# Patient Record
Sex: FEMALE | Race: WHITE | NOT HISPANIC OR LATINO | Employment: FULL TIME | URBAN - METROPOLITAN AREA
[De-identification: names, ages, dates, MRNs, and addresses within clinical notes are randomized per-mention and may not be internally consistent; named-entity substitution may affect disease eponyms.]

---

## 2023-04-27 PROBLEM — H16.002 ULCER OF LEFT CORNEA: Status: ACTIVE | Noted: 2023-04-27

## 2023-04-27 PROBLEM — R79.89 LOW VITAMIN D LEVEL: Status: ACTIVE | Noted: 2023-04-27

## 2023-04-27 PROBLEM — M77.9 TENDONITIS: Status: ACTIVE | Noted: 2023-04-27

## 2023-04-27 PROBLEM — Z15.09 BRCA2 GENETIC CARRIER: Status: ACTIVE | Noted: 2023-04-27

## 2023-04-27 PROBLEM — G89.29 ABDOMINAL PAIN, CHRONIC, GENERALIZED: Status: ACTIVE | Noted: 2023-04-27

## 2023-04-27 PROBLEM — O35.EXX0: Status: ACTIVE | Noted: 2023-04-27

## 2023-04-27 PROBLEM — R53.83 FATIGUE: Status: ACTIVE | Noted: 2023-04-27

## 2023-04-27 PROBLEM — F34.1 PRIMARY DYSTHYMIA: Status: ACTIVE | Noted: 2023-04-27

## 2023-04-27 PROBLEM — M25.579 ANKLE PAIN: Status: ACTIVE | Noted: 2023-04-27

## 2023-04-27 PROBLEM — O99.891 MATERNAL RHEUMATOID ARTHRITIS COMPLICATING PREGNANCY (MULTI): Status: ACTIVE | Noted: 2023-04-27

## 2023-04-27 PROBLEM — R92.8 ABNORMAL FINDING ON BREAST IMAGING: Status: ACTIVE | Noted: 2023-04-27

## 2023-04-27 PROBLEM — N63.0 MASS OF BREAST: Status: ACTIVE | Noted: 2023-04-27

## 2023-04-27 PROBLEM — F41.9 ANXIETY: Status: ACTIVE | Noted: 2023-04-27

## 2023-04-27 PROBLEM — M06.9 RHEUMATOID ARTHRITIS (MULTI): Status: ACTIVE | Noted: 2023-04-27

## 2023-04-27 PROBLEM — Z98.890 S/P BREAST RECONSTRUCTION: Status: ACTIVE | Noted: 2023-04-27

## 2023-04-27 PROBLEM — M08.80 JUVENILE IDIOPATHIC ARTHRITIS (MULTI): Status: ACTIVE | Noted: 2023-04-27

## 2023-04-27 PROBLEM — M76.62 ACHILLES TENDINITIS OF LEFT LOWER EXTREMITY: Status: ACTIVE | Noted: 2023-04-27

## 2023-04-27 PROBLEM — Z84.81 FAMILY HISTORY OF BRCA2 GENE POSITIVE: Status: ACTIVE | Noted: 2023-04-27

## 2023-04-27 PROBLEM — M92.62 HAGLUND'S DEFORMITY, LEFT: Status: ACTIVE | Noted: 2023-04-27

## 2023-04-27 PROBLEM — K58.0 IRRITABLE BOWEL SYNDROME WITH DIARRHEA: Status: ACTIVE | Noted: 2023-04-27

## 2023-04-27 PROBLEM — M54.6 THORACIC BACK PAIN: Status: ACTIVE | Noted: 2023-04-27

## 2023-04-27 PROBLEM — K59.00 CONSTIPATION: Status: ACTIVE | Noted: 2023-04-27

## 2023-04-27 PROBLEM — M25.50 ARTHRALGIA: Status: ACTIVE | Noted: 2023-04-27

## 2023-04-27 PROBLEM — S29.012A STRAIN OF THORACIC SPINE: Status: ACTIVE | Noted: 2023-04-27

## 2023-04-27 PROBLEM — R10.84 ABDOMINAL PAIN, CHRONIC, GENERALIZED: Status: ACTIVE | Noted: 2023-04-27

## 2023-04-27 PROBLEM — O24.419: Status: ACTIVE | Noted: 2023-04-27

## 2023-04-27 PROBLEM — R10.9 ABDOMINAL CRAMPING: Status: ACTIVE | Noted: 2023-04-27

## 2023-04-27 PROBLEM — M06.9: Status: ACTIVE | Noted: 2023-04-27

## 2023-04-27 PROBLEM — R92.8 ABNORMAL MRI, BREAST: Status: ACTIVE | Noted: 2023-04-27

## 2023-04-27 PROBLEM — Z90.13 ACQUIRED ABSENCE OF BOTH BREASTS: Status: ACTIVE | Noted: 2023-04-27

## 2023-04-27 PROBLEM — M06.9 MATERNAL RHEUMATOID ARTHRITIS COMPLICATING PREGNANCY (MULTI): Status: ACTIVE | Noted: 2023-04-27

## 2023-04-27 PROBLEM — N93.9 VAGINAL BLEEDING, ABNORMAL: Status: ACTIVE | Noted: 2023-04-27

## 2023-04-27 PROBLEM — S90.32XA CONTUSION OF FOOT, LEFT: Status: ACTIVE | Noted: 2023-04-27

## 2023-04-27 PROBLEM — M25.552 HIP PAIN, ACUTE, LEFT: Status: ACTIVE | Noted: 2023-04-27

## 2023-04-27 PROBLEM — M54.9 BACK ACHE: Status: ACTIVE | Noted: 2023-04-27

## 2023-04-27 PROBLEM — M76.60 DISTAL ACHILLES TENDINITIS: Status: ACTIVE | Noted: 2023-04-27

## 2023-04-27 PROBLEM — Z15.01 BRCA2 GENETIC CARRIER: Status: ACTIVE | Noted: 2023-04-27

## 2023-04-27 PROBLEM — N94.6 DYSMENORRHEA: Status: ACTIVE | Noted: 2023-04-27

## 2023-04-27 PROBLEM — N64.4 BREAST PAIN, LEFT: Status: ACTIVE | Noted: 2023-04-27

## 2023-04-27 RX ORDER — CYCLOBENZAPRINE HCL 10 MG
1 TABLET ORAL 3 TIMES DAILY PRN
COMMUNITY
Start: 2022-10-04 | End: 2024-04-01 | Stop reason: ALTCHOICE

## 2023-04-27 RX ORDER — CITALOPRAM 10 MG/1
1 TABLET ORAL DAILY
COMMUNITY
End: 2023-12-26 | Stop reason: SDUPTHER

## 2023-04-27 RX ORDER — METHYLPREDNISOLONE 4 MG/1
TABLET ORAL
COMMUNITY
Start: 2022-10-04 | End: 2024-04-01 | Stop reason: ALTCHOICE

## 2023-04-27 RX ORDER — MELOXICAM 15 MG/1
1 TABLET ORAL DAILY
COMMUNITY
Start: 2022-10-04 | End: 2023-06-20 | Stop reason: SDUPTHER

## 2023-05-01 ENCOUNTER — APPOINTMENT (OUTPATIENT)
Dept: PRIMARY CARE | Facility: CLINIC | Age: 38
End: 2023-05-01
Payer: COMMERCIAL

## 2023-06-20 DIAGNOSIS — M06.9 RHEUMATOID ARTHRITIS, INVOLVING UNSPECIFIED SITE, UNSPECIFIED WHETHER RHEUMATOID FACTOR PRESENT (MULTI): ICD-10-CM

## 2023-06-21 RX ORDER — MELOXICAM 15 MG/1
15 TABLET ORAL DAILY PRN
Qty: 90 TABLET | Refills: 3 | Status: SHIPPED | OUTPATIENT
Start: 2023-06-21 | End: 2024-05-15 | Stop reason: SDUPTHER

## 2023-10-31 ENCOUNTER — APPOINTMENT (OUTPATIENT)
Dept: PRIMARY CARE | Facility: CLINIC | Age: 38
End: 2023-10-31
Payer: COMMERCIAL

## 2023-11-07 ENCOUNTER — OFFICE VISIT (OUTPATIENT)
Dept: PRIMARY CARE | Facility: CLINIC | Age: 38
End: 2023-11-07
Payer: COMMERCIAL

## 2023-11-07 VITALS
WEIGHT: 215 LBS | HEART RATE: 97 BPM | HEIGHT: 67 IN | DIASTOLIC BLOOD PRESSURE: 78 MMHG | TEMPERATURE: 97.2 F | RESPIRATION RATE: 14 BRPM | BODY MASS INDEX: 33.74 KG/M2 | OXYGEN SATURATION: 97 % | SYSTOLIC BLOOD PRESSURE: 120 MMHG

## 2023-11-07 DIAGNOSIS — M06.9: ICD-10-CM

## 2023-11-07 DIAGNOSIS — R07.89 ATYPICAL CHEST PAIN: Primary | ICD-10-CM

## 2023-11-07 DIAGNOSIS — F32.A ANXIETY AND DEPRESSION: ICD-10-CM

## 2023-11-07 DIAGNOSIS — F41.9 ANXIETY AND DEPRESSION: ICD-10-CM

## 2023-11-07 PROCEDURE — 3074F SYST BP LT 130 MM HG: CPT | Performed by: INTERNAL MEDICINE

## 2023-11-07 PROCEDURE — 3078F DIAST BP <80 MM HG: CPT | Performed by: INTERNAL MEDICINE

## 2023-11-07 PROCEDURE — 99213 OFFICE O/P EST LOW 20 MIN: CPT | Performed by: INTERNAL MEDICINE

## 2023-11-07 PROCEDURE — 1036F TOBACCO NON-USER: CPT | Performed by: INTERNAL MEDICINE

## 2023-11-07 RX ORDER — TIRZEPATIDE 2.5 MG/.5ML
INJECTION, SOLUTION SUBCUTANEOUS
COMMUNITY
Start: 2023-09-25

## 2023-11-07 NOTE — PROGRESS NOTES
"Subjective    Sharon Lino is a 38 y.o. female who presents for Anxiety.  HPI    Started Mounjaro in September. Was on it for 5 weeks. Around the same time started on EPIC at work.     [employer], [employer].     Started with anxiety, depression, crying. Restarted Celexa and is doing  well. She is not sure if the mounjaro is affecting her   Mood but she wants to restart it she lost 10 lbs while on it.    Wondering if its ok to restart Mounjaro.  She is doing great on it and she was worried it was causing her stress.   She has chest pain and she is not sure if she needs to be screening for lung cancer . Smoked 10 years            Review of Systems   All other systems reviewed and are negative.        Objective     /78 (BP Location: Left arm, Patient Position: Sitting, BP Cuff Size: Adult)   Pulse 97   Temp 36.2 °C (97.2 °F) (Skin)   Resp 14   Ht 1.702 m (5' 7\")   Wt 97.5 kg (215 lb)   SpO2 97%   BMI 33.67 kg/m²    Physical Exam  Vitals reviewed.   Constitutional:       General: She is not in acute distress.     Appearance: Normal appearance.   Cardiovascular:      Rate and Rhythm: Normal rate and regular rhythm.      Pulses: Normal pulses.      Heart sounds: Normal heart sounds.   Pulmonary:      Effort: Pulmonary effort is normal.      Breath sounds: Normal breath sounds.   Abdominal:      Tenderness: There is no abdominal tenderness.   Musculoskeletal:         General: No swelling.   Skin:     General: Skin is warm and dry.   Neurological:      Mental Status: She is alert.       Health Maintenance Due   Topic Date Due    Yearly Adult Physical  Never done    Hepatitis B Vaccines (1 of 3 - 3-dose series) Never done    HIV Screening  Never done    MMR Vaccines (1 of 1 - Standard series) Never done    Varicella Vaccines (1 of 2 - 2-dose childhood series) Never done    COVID-19 Vaccine (4 - Moderna series) 01/18/2022          Assessment/Plan   Problem List Items Addressed This Visit       Rheumatoid " arthritis involving left hip (CMS/HCC)     Other Visit Diagnoses       Atypical chest pain    -  Primary    Relevant Orders    XR chest 2 views    Anxiety and depression            Sample of mounjaro given  Doubt that has anything to do with her mood.   Likely the stress of work.  Call  with any problems or questions.   Follow up as needed  We discussed lung cancer screening recommendations. She is too young.  However she does have hx of  RA so will start with cxr.

## 2023-11-13 ENCOUNTER — HOSPITAL ENCOUNTER (OUTPATIENT)
Dept: RADIOLOGY | Facility: HOSPITAL | Age: 38
Discharge: HOME | End: 2023-11-13
Payer: COMMERCIAL

## 2023-11-13 DIAGNOSIS — R07.89 ATYPICAL CHEST PAIN: ICD-10-CM

## 2023-11-13 PROCEDURE — 71046 X-RAY EXAM CHEST 2 VIEWS: CPT

## 2023-11-13 PROCEDURE — 71046 X-RAY EXAM CHEST 2 VIEWS: CPT | Performed by: RADIOLOGY

## 2023-12-13 DIAGNOSIS — H92.09 EARACHE: Primary | ICD-10-CM

## 2023-12-13 RX ORDER — DOXYCYCLINE 100 MG/1
100 CAPSULE ORAL 2 TIMES DAILY
Qty: 20 CAPSULE | Refills: 0 | Status: SHIPPED | OUTPATIENT
Start: 2023-12-13 | End: 2023-12-23

## 2023-12-26 DIAGNOSIS — F32.A ANXIETY AND DEPRESSION: ICD-10-CM

## 2023-12-26 DIAGNOSIS — F41.9 ANXIETY AND DEPRESSION: ICD-10-CM

## 2023-12-26 NOTE — TELEPHONE ENCOUNTER
----- Message from Sharon Lino sent at 12/26/2023  2:55 PM EST -----  Regarding: Antibiotic   Contact: [phone]  Hi Dr. Escalante,     Can you please refill my Celexa 10mg please? [facility] [place] is my pharmacy. Thanks! Hope you had a good holiday!

## 2023-12-27 RX ORDER — CITALOPRAM 10 MG/1
10 TABLET ORAL DAILY
Qty: 90 TABLET | Refills: 3 | Status: SHIPPED | OUTPATIENT
Start: 2023-12-27 | End: 2024-12-26

## 2024-03-20 ENCOUNTER — APPOINTMENT (OUTPATIENT)
Dept: GASTROENTEROLOGY | Facility: HOSPITAL | Age: 39
End: 2024-03-20
Payer: COMMERCIAL

## 2024-03-21 ENCOUNTER — OFFICE VISIT (OUTPATIENT)
Dept: GASTROENTEROLOGY | Facility: CLINIC | Age: 39
End: 2024-03-21
Payer: COMMERCIAL

## 2024-03-21 VITALS
RESPIRATION RATE: 18 BRPM | OXYGEN SATURATION: 96 % | SYSTOLIC BLOOD PRESSURE: 114 MMHG | DIASTOLIC BLOOD PRESSURE: 76 MMHG | BODY MASS INDEX: 30.49 KG/M2 | WEIGHT: 194.3 LBS | HEART RATE: 78 BPM | HEIGHT: 67 IN

## 2024-03-21 DIAGNOSIS — R13.19 ESOPHAGEAL DYSPHAGIA: Primary | ICD-10-CM

## 2024-03-21 PROCEDURE — 1036F TOBACCO NON-USER: CPT | Performed by: INTERNAL MEDICINE

## 2024-03-21 PROCEDURE — 3078F DIAST BP <80 MM HG: CPT | Performed by: INTERNAL MEDICINE

## 2024-03-21 PROCEDURE — 99204 OFFICE O/P NEW MOD 45 MIN: CPT | Performed by: INTERNAL MEDICINE

## 2024-03-21 PROCEDURE — 3074F SYST BP LT 130 MM HG: CPT | Performed by: INTERNAL MEDICINE

## 2024-03-21 RX ORDER — OMEPRAZOLE 40 MG/1
40 CAPSULE, DELAYED RELEASE ORAL
Qty: 60 CAPSULE | Refills: 1 | Status: SHIPPED | OUTPATIENT
Start: 2024-03-21 | End: 2024-05-20

## 2024-03-21 NOTE — PROGRESS NOTES
Subjective   Patient ID: Sharon Lino is a 38 y.o. female who presents for GERD (Pt reports feeling like food is getting stuck when swallowing/regurgitating food and a burning sensation coming up her throat. Reports always having symptoms, however, this last week has been worse. No hx of EGD).  HPI  On  PPI for last year for heart burn    For last 1 weeks her symptoms are worse.     She has tried gaviscon which has helped . But it feels it is stuck.,     On Mounjaro.     Current Outpatient Medications on File Prior to Visit   Medication Sig Dispense Refill    citalopram (CeleXA) 10 mg tablet Take 1 tablet (10 mg) by mouth once daily. 90 tablet 3    meloxicam (Mobic) 15 mg tablet Take 1 tablet (15 mg) by mouth once daily as needed for moderate pain (4 - 6). 90 tablet 3    Mounjaro 2.5 mg/0.5 mL pen injector INJECT 2.5MG SUBCUTANEOUSLY ONCE EVERY WEEK      cyclobenzaprine (Flexeril) 10 mg tablet Take 1 tablet (10 mg) by mouth 3 times a day as needed.      methylPREDNISolone (Medrol Dospak) 4 mg tablets Take by mouth.       No current facility-administered medications on file prior to visit.        Review of Systems   Constitutional: Negative.  Negative for chills, fever and unexpected weight change.   HENT:  Negative for trouble swallowing.    Respiratory: Negative.  Negative for shortness of breath.    Cardiovascular: Negative.  Negative for chest pain.   Gastrointestinal:  Negative for abdominal distention, abdominal pain, anal bleeding, blood in stool, constipation, diarrhea, nausea, rectal pain and vomiting.   Endocrine: Negative.    Genitourinary: Negative.    Skin: Negative.  Negative for color change.   Neurological: Negative.        Objective   Physical Exam  Constitutional:       Appearance: Normal appearance.   HENT:      Head: Normocephalic and atraumatic.   Cardiovascular:      Rate and Rhythm: Normal rate and regular rhythm.      Pulses: Normal pulses.      Heart sounds: Normal heart sounds.  "  Pulmonary:      Effort: Pulmonary effort is normal.      Breath sounds: Normal breath sounds.   Abdominal:      General: Abdomen is flat. Bowel sounds are normal.      Palpations: Abdomen is soft.      Tenderness: There is no abdominal tenderness.   Musculoskeletal:         General: Normal range of motion.      Cervical back: Normal range of motion.   Skin:     General: Skin is warm.   Neurological:      Mental Status: She is alert.       /76 (BP Location: Right arm, Patient Position: Sitting, BP Cuff Size: Adult)   Pulse 78   Resp 18   Ht 1.702 m (5' 7\")   Wt 88.1 kg (194 lb 4.8 oz)   SpO2 96%   BMI 30.43 kg/m²      Lab Results   Component Value Date    WBC 5.8 12/08/2022    HGB 12.7 12/08/2022    HCT 39.9 12/08/2022    MCV 86 12/08/2022     12/08/2022           No lab exists for component: \"LABALBU\"    No results found for: \"AFP\"  Lab Results   Component Value Date    TSH 2.03 12/08/2022         CT ABDOMEN PELVIS W IV CONTRAST (Order [id number])     CT ABDOMEN PELVIS W IV CONTRAST  Order: [id number]  Impression    IMPRESSION:    There are 2 adjacent right adnexal cysts measuring 4.7 cm and 4.0 cm.  This can be further evaluated with pelvic ultrasound if clinically  indicated.    Small fat-containing umbilical hernia.  Mild associated fat stranding.              : AMIE    Transcribe Date/Time: Apr 26 2023  5:25P    Dictated by : CHELA AGUILERA MD    This examination was interpreted and the report reviewed and  electronically signed by:  CHELA AGUILERA MD on Apr 26 2023  5:39PM  EST  Narrative    * * *Final Report* * *    DATE OF EXAM: Apr 26 2023  5:08PM      [facility]   0530  -  CT ABD/PEL W IVCON  / ACCESSION #  [id number]    PROCEDURE REASON: Nausea/vomiting        * * * * Physician Interpretation * * * *     EXAMINATION:  CT ABDOMEN AND PELVIS WITH IV CONTRAST    CLINICAL HISTORY: Pain    TECHNIQUE: CT of the abdomen and pelvis was performed using " standard  technique, scanning from just above the dome of the diaphragm to the  symphysis pubis.  MQ:  CTAP_3    Contrast:  IV:  100 ml of Omnipaque 350  : ml of    CT Radiation dose: Integrated Dose-length product (DLP) for this visit =    768 mGy*cm.  CT Dose Reduction Employed: Automated exposure control (AEC)    COMPARISON: 03/26/2022      RESULT:    Liver: No mass.    Biliary: No bile duct dilation.  Gallbladder is collapsed.    Spleen: No mass. No splenomegaly.    Pancreas: No mass or duct dilation.    Adrenals: No mass.    Kidneys: No mass, calculus or hydronephrosis.    GI tract: No dilation or wall thickening. Prior appendectomy.    Lymph nodes: There are a few borderline enlarged presacral/perirectal  nodes, similar to prior.    Mesentery/Peritoneum: No ascites or mass.    Retroperitoneum: No mass.    Vasculature:   - Abdominal aorta and iliac arteries: No aneurysm.   - Celiac and SMA: Patent without stenosis.   - Portal venous system (SMV, splenic vein, portal vein and branches):  Patent.   - Hepatic veins: Patent.    Pelvis: There are 2 adjacent right adnexal cysts measuring 4.7 cm and 4.0  cm.    Bones/Soft Tissues: Breast implants.  Small fat-containing umbilical  hernia.  Mild associated fat stranding.    Lower thorax: Unremarkable.     (topogram) images: No additional findings.  Exam End: 04/26/23 17:08    Specimen Collected: 04/26/23 17:08 Last Resulted: 04/26/23 17:41   Received From: [facility]          Assessment/Plan   Diagnoses and all orders for this visit:  Esophageal dysphagia  -     EGD; Future  -     omeprazole (PriLOSEC) 40 mg DR capsule; Take 1 capsule (40 mg) by mouth 2 times a day before meals. Do not crush or chew.  GERD: Possibly worsening of symptoms due to Mounjaro.   Stop Mounjaro.

## 2024-03-21 NOTE — PATIENT INSTRUCTIONS
Esophageal dysphagia  -     EGD; Future  -     omeprazole (PriLOSEC) 40 mg DR capsule; Take 1 capsule (40 mg) by mouth 2 times a day before meals. Do not crush or chew.    Stop Mounjaro.

## 2024-03-28 ENCOUNTER — APPOINTMENT (OUTPATIENT)
Dept: GASTROENTEROLOGY | Facility: CLINIC | Age: 39
End: 2024-03-28
Payer: COMMERCIAL

## 2024-04-01 ENCOUNTER — HOSPITAL ENCOUNTER (OUTPATIENT)
Dept: GASTROENTEROLOGY | Facility: EXTERNAL LOCATION | Age: 39
Discharge: HOME | End: 2024-04-01
Payer: COMMERCIAL

## 2024-04-01 VITALS
HEART RATE: 79 BPM | HEIGHT: 67 IN | WEIGHT: 190 LBS | RESPIRATION RATE: 20 BRPM | TEMPERATURE: 98.1 F | SYSTOLIC BLOOD PRESSURE: 98 MMHG | BODY MASS INDEX: 29.82 KG/M2 | DIASTOLIC BLOOD PRESSURE: 68 MMHG | OXYGEN SATURATION: 99 %

## 2024-04-01 DIAGNOSIS — R13.19 ESOPHAGEAL DYSPHAGIA: Primary | ICD-10-CM

## 2024-04-01 PROCEDURE — 88342 IMHCHEM/IMCYTCHM 1ST ANTB: CPT

## 2024-04-01 PROCEDURE — 88305 TISSUE EXAM BY PATHOLOGIST: CPT

## 2024-04-01 PROCEDURE — 43239 EGD BIOPSY SINGLE/MULTIPLE: CPT | Performed by: INTERNAL MEDICINE

## 2024-04-01 PROCEDURE — 88305 TISSUE EXAM BY PATHOLOGIST: CPT | Performed by: STUDENT IN AN ORGANIZED HEALTH CARE EDUCATION/TRAINING PROGRAM

## 2024-04-01 PROCEDURE — 88342 IMHCHEM/IMCYTCHM 1ST ANTB: CPT | Performed by: STUDENT IN AN ORGANIZED HEALTH CARE EDUCATION/TRAINING PROGRAM

## 2024-04-01 RX ORDER — SODIUM CHLORIDE 9 MG/ML
20 INJECTION, SOLUTION INTRAVENOUS CONTINUOUS
Status: DISCONTINUED | OUTPATIENT
Start: 2024-04-01 | End: 2024-04-02 | Stop reason: HOSPADM

## 2024-04-01 ASSESSMENT — ENCOUNTER SYMPTOMS
FEVER: 0
ABDOMINAL PAIN: 0
NEUROLOGICAL NEGATIVE: 1
BLOOD IN STOOL: 0
SHORTNESS OF BREATH: 0
CONSTIPATION: 0
BLOOD IN STOOL: 0
CONSTITUTIONAL NEGATIVE: 1
COLOR CHANGE: 0
ABDOMINAL DISTENTION: 0
DIARRHEA: 0
TROUBLE SWALLOWING: 0
DIARRHEA: 0
CARDIOVASCULAR NEGATIVE: 1
RECTAL PAIN: 0
VOMITING: 0
CHILLS: 0
ENDOCRINE NEGATIVE: 1
UNEXPECTED WEIGHT CHANGE: 0
SHORTNESS OF BREATH: 0
CONSTIPATION: 0
ANAL BLEEDING: 0
NAUSEA: 0
TROUBLE SWALLOWING: 0
RECTAL PAIN: 0
ABDOMINAL DISTENTION: 0
COLOR CHANGE: 0
NAUSEA: 0
CHILLS: 0
VOMITING: 0
ABDOMINAL PAIN: 0
UNEXPECTED WEIGHT CHANGE: 0
RESPIRATORY NEGATIVE: 1
FEVER: 0
ANAL BLEEDING: 0

## 2024-04-01 ASSESSMENT — PAIN SCALES - GENERAL
PAINLEVEL_OUTOF10: 0 - NO PAIN

## 2024-04-01 ASSESSMENT — COLUMBIA-SUICIDE SEVERITY RATING SCALE - C-SSRS
6. HAVE YOU EVER DONE ANYTHING, STARTED TO DO ANYTHING, OR PREPARED TO DO ANYTHING TO END YOUR LIFE?: NO
2. HAVE YOU ACTUALLY HAD ANY THOUGHTS OF KILLING YOURSELF?: NO
1. IN THE PAST MONTH, HAVE YOU WISHED YOU WERE DEAD OR WISHED YOU COULD GO TO SLEEP AND NOT WAKE UP?: NO

## 2024-04-01 ASSESSMENT — PAIN - FUNCTIONAL ASSESSMENT
PAIN_FUNCTIONAL_ASSESSMENT: 0-10

## 2024-04-01 NOTE — H&P
History Of Present Illness  Sharon Lino is a 38 y.o. female presenting with dysphagia.  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    Gestational diabetes mellitus in pregnancy, unspecified control 05/17/2017    Gestational diabetes mellitus (GDM) affecting pregnancy, antepartum    Other acute postprocedural pain 08/04/2020    Post-op pain    Personal history of other diseases of the female genital tract 05/17/2017    History of endometriosis    Personal history of other diseases of the musculoskeletal system and connective tissue 05/17/2017    History of rheumatoid arthritis     Surgical History  Past Surgical History:   Procedure Laterality Date    DILATION AND CURETTAGE OF UTERUS  10/16/2018    Dilation And Curettage    LAPAROSCOPY DIAGNOSTIC / BIOPSY / ASPIRATION / LYSIS  05/17/2017    Laparoscopy (Diagnostic)    OTHER SURGICAL HISTORY  09/27/2021    Mastectomy    OTHER SURGICAL HISTORY  09/27/2021    Breast reconstruction    TONSILLECTOMY  05/17/2017    Tonsillectomy     Social History  She reports that she quit smoking about 9 years ago. Her smoking use included cigarettes. She has never used smokeless tobacco. She reports current alcohol use. She reports that she does not use drugs.    Family History  No family history on file.     Allergies  No Known Allergies  Review of Systems   Constitutional:  Negative for chills, fever and unexpected weight change.   HENT:  Negative for trouble swallowing.    Respiratory:  Negative for shortness of breath.    Cardiovascular:  Negative for chest pain.   Gastrointestinal:  Negative for abdominal distention, abdominal pain, anal bleeding, blood in stool, constipation, diarrhea, nausea, rectal pain and vomiting.   Skin:  Negative for color change.        Physical Exam  Vitals reviewed.   Constitutional:       General: She is awake.      Appearance: Normal  appearance.   HENT:      Head: Normocephalic and atraumatic.      Nose: Nose normal.      Mouth/Throat:      Mouth: Mucous membranes are moist.   Eyes:      Pupils: Pupils are equal, round, and reactive to light.   Cardiovascular:      Rate and Rhythm: Normal rate.   Pulmonary:      Effort: Pulmonary effort is normal.   Neurological:      Mental Status: She is alert and oriented to person, place, and time. Mental status is at baseline.   Psychiatric:         Attention and Perception: Attention and perception normal.         Mood and Affect: Mood normal.         Behavior: Behavior normal.          Last Recorded Vitals  There were no vitals taken for this visit.    Assessment/Plan   Dysphagia  GERD  EGD     Ashutosh Kimball MD

## 2024-04-03 ENCOUNTER — TELEPHONE (OUTPATIENT)
Dept: PRIMARY CARE | Facility: CLINIC | Age: 39
End: 2024-04-03
Payer: COMMERCIAL

## 2024-04-03 DIAGNOSIS — M25.50 ARTHRALGIA, UNSPECIFIED JOINT: Primary | ICD-10-CM

## 2024-04-03 RX ORDER — METHYLPREDNISOLONE 4 MG/1
TABLET ORAL
Qty: 21 TABLET | Refills: 0 | Status: SHIPPED | OUTPATIENT
Start: 2024-04-03 | End: 2024-04-10

## 2024-04-03 NOTE — TELEPHONE ENCOUNTER
Pt called asking for the following prescription due to back pain. I informed her that she might have to make an appointment to receive the prescription.    Rx Refill Request Telephone Encounter    Name:  Sharon Lino  :  [id number]  Medication Name:  methylPREDNISolone (Medrol Dospak) 4 mg tablets   Specific Pharmacy location:  [id number] - [address] [place]    Date of last appointment:  23  Date of next appointment:  n/a  Best number to reach patient:  [phone]

## 2024-04-03 NOTE — TELEPHONE ENCOUNTER
----- Message from Cindy Shrestha CMA sent at 4/3/2024 10:32 AM EDT -----  Regarding: FW: Medrol dose pack  Contact: [phone]    ----- Message -----  From: Sharon Lino  Sent: 4/3/2024   8:36 AM EDT  To: [facility] [place] [id number] Clinical Support Staff  Subject: Medrol dose pack                                 Hi Dr. Escalante,     I slept wrong and have neck and back pain. Can I please get a Medrol Dose pack please? I have been taking taking Mortin and Tylenol. Thank you.

## 2024-04-04 ENCOUNTER — APPOINTMENT (OUTPATIENT)
Dept: GASTROENTEROLOGY | Facility: CLINIC | Age: 39
End: 2024-04-04
Payer: COMMERCIAL

## 2024-04-10 LAB
LAB AP ASR DISCLAIMER: NORMAL
LABORATORY COMMENT REPORT: NORMAL
PATH REPORT.FINAL DX SPEC: NORMAL
PATH REPORT.GROSS SPEC: NORMAL
PATH REPORT.TOTAL CANCER: NORMAL

## 2024-04-29 ENCOUNTER — TELEPHONE (OUTPATIENT)
Dept: GYNECOLOGIC ONCOLOGY | Facility: HOSPITAL | Age: 39
End: 2024-04-29
Payer: COMMERCIAL

## 2024-04-29 NOTE — TELEPHONE ENCOUNTER
Patient called and stated that she is having lower abdominal/pelvic pain that will not subside with medication but is slightly eased with heat. She denies any constipation or diarrhea. She stated that the pain started on Friday after her menstrual cycle was complete.

## 2024-05-01 NOTE — RESULT ENCOUNTER NOTE
During recent upper endoscopy biopsies were taken from the stomach and the food pipe.  Pathology results showed these as completely normal.  Which means no infection or inflammation was identified.  If you continue to have issues with swallowing please make a follow-up appointment with me it can be a virtual visit.  Do not hesitate to contact me if you have any questions or concerns.  Sincerely,

## 2024-05-13 NOTE — PROGRESS NOTES
Patient ID: Sharon Lino is a 38 y.o. female.  Referring Physician: No referring provider defined for this encounter.  Primary Care Provider: Sandra Lind DO    Subjective    Interval History:  Notes LLQ pain persisting for 2 weeks simultaneously with her period she suspects its a cyst, Hx of cysts and similar pain with shorter duration, bowel movements, bladder and appetite are normal and she denies nausea.    She denies fever, chills, chest pain, SOB, nausea, vomiting, diarrhea, constipation, dysuria, or any other concerning signs of symptoms.          PMH:  Rheumatoid arthritis   Anxiety      Past Surgical History:  Laparoscopic appendectomy   Double mastectomy with reconstruction  D&C   Tonsillectomy     Family History:    - Father, no cancer diagnosis; BRCA2 mutation positive  - Sister, no cancer diagnosis; BRCA2 mutation positive  - PGM, breast cancer x2, pancreatic cancer, uterine cancer, bladder cancer; BRCA2 mutation positive;   - Paternal great-aunt, breast cancer age 50;   - Paternal great-great grandmother, gastric cancer,  at 53  - No Ashkenazi [employer]al ancestry     Social History: Denies current use of smoking, alcohol abuse or recreational drug use.  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     OBGYN History:  The patient is a . History of 2 SVDs. Menarche age 14. Completed childbearing. Heavy, painful periods every 2-3 weeks  using both pads and tampons changing them every 2-3 hours. Has tried LNG-IUD, OCPs.      Screening:  -Pap smear: 23 NILM/ HPV-  -Mammogram: undergoing high risk screening with breast surgery  -Colonoscopy: negative    Objective    BSA: 2.04 meters squared  /73 (BP Location: Left arm, Patient Position: Sitting, BP Cuff Size: Adult)   Pulse 72   Temp 36.3 °C (97.3 °F) (Temporal)   Resp 16   Wt 88.3 kg (194 lb 12.4 oz)   SpO2 97%   BMI 30.51 kg/m²      Physical Exam  Constitutional:       General: She  is not in acute distress.     Appearance: Normal appearance. She is not toxic-appearing.   HENT:      Head: Normocephalic.      Mouth/Throat:      Mouth: Mucous membranes are moist.      Pharynx: Oropharynx is clear.   Eyes:      Extraocular Movements: Extraocular movements intact.      Conjunctiva/sclera: Conjunctivae normal.      Pupils: Pupils are equal, round, and reactive to light.   Cardiovascular:      Rate and Rhythm: Normal rate and regular rhythm.      Heart sounds: Normal heart sounds. No murmur heard.     No friction rub. No gallop.   Pulmonary:      Effort: Pulmonary effort is normal.      Breath sounds: Normal breath sounds. No wheezing or rhonchi.   Abdominal:      General: Bowel sounds are normal. There is no distension.      Palpations: Abdomen is soft.      Tenderness: There is no abdominal tenderness.   Genitourinary:     Comments: Normal external genitalia no lesions or masses are appreciated  Bimanual examination: Smooth vagina no lesions or masses, tenderness to palpation particularly with the vaginal hand in the left pelvis, no discreet masses or lesions are noted, mild tenderness in the midline    Musculoskeletal:         General: Normal range of motion.      Cervical back: Normal range of motion.   Skin:     General: Skin is warm.   Neurological:      General: No focal deficit present.      Mental Status: She is alert and oriented to person, place, and time.   Psychiatric:         Mood and Affect: Mood normal.         Behavior: Behavior normal.         Performance Status:  Asymptomatic    Assessment/Plan     Oncology History    No history exists.   38 y.o.   here with BRCA2+ to establish with HMB  Comorbidities RA, anxiety   ECO     # BRCA2+  - We reviewed cancers associated with the BRCA mutation, and their relative risks. Specifically we discussed an approximately 15-30% lifetime risk of developing fallopian, ovarian or primary peritoneal  cancer.   - We discussed that there is no  effective screening for ovarian cancer  - We discussed preventative measures such as COCs  - We discussed the recommendation for rrBSO at the age 40-45 years  - We discussed that in about 12% of rrBSO surgeries an occult malignancy is discovered  - We discussed that unfortunately screening with  and pelvic US are not recommended but can be considered should symptoms change with time  - She would like to defer rrBSO until age 41 which is very reasonable     # HMB   - We discussed no association between BRCA2+ and uterine cancer   - S/p endometrial ablation  - She understands limitations with endometrial sampling after an ablation and should concern arise for malignancy would recommend hysterectomy   - Patient presenting with a 2 week period of LLQ pain, has had pain like this previously with cysts however it has never lasted this long.  - Plan for pelvic ultrasound with phone visit to follow, no discreete lesions or masses noted on examination today.      Scribe Attestation  By signing my name below, I, Kermit Interiano   attest that this documentation has been prepared under the direction and in the presence of Nicky Olvera MD.     Provider Attestation - Scribe documentation    All medical record entries made by the Scribe were at my direction and personally dictated by me. I have reviewed the chart and agree that the record accurately reflects my personal performance of the history, physical exam, discussion and plan.    Nicky Olvera MD

## 2024-05-14 ENCOUNTER — OFFICE VISIT (OUTPATIENT)
Dept: GYNECOLOGIC ONCOLOGY | Facility: CLINIC | Age: 39
End: 2024-05-14
Payer: COMMERCIAL

## 2024-05-14 VITALS
SYSTOLIC BLOOD PRESSURE: 108 MMHG | BODY MASS INDEX: 30.51 KG/M2 | OXYGEN SATURATION: 97 % | DIASTOLIC BLOOD PRESSURE: 73 MMHG | HEART RATE: 72 BPM | TEMPERATURE: 97.3 F | RESPIRATION RATE: 16 BRPM | WEIGHT: 194.78 LBS

## 2024-05-14 DIAGNOSIS — R10.2 PELVIC PAIN: ICD-10-CM

## 2024-05-14 DIAGNOSIS — Z15.09 BRCA2 GENETIC CARRIER: Primary | ICD-10-CM

## 2024-05-14 DIAGNOSIS — Z15.01 BRCA2 GENETIC CARRIER: Primary | ICD-10-CM

## 2024-05-14 PROCEDURE — 99214 OFFICE O/P EST MOD 30 MIN: CPT | Performed by: STUDENT IN AN ORGANIZED HEALTH CARE EDUCATION/TRAINING PROGRAM

## 2024-05-14 PROCEDURE — 3074F SYST BP LT 130 MM HG: CPT | Performed by: STUDENT IN AN ORGANIZED HEALTH CARE EDUCATION/TRAINING PROGRAM

## 2024-05-14 PROCEDURE — 3078F DIAST BP <80 MM HG: CPT | Performed by: STUDENT IN AN ORGANIZED HEALTH CARE EDUCATION/TRAINING PROGRAM

## 2024-05-14 PROCEDURE — 1036F TOBACCO NON-USER: CPT | Performed by: STUDENT IN AN ORGANIZED HEALTH CARE EDUCATION/TRAINING PROGRAM

## 2024-05-14 ASSESSMENT — PAIN SCALES - GENERAL: PAINLEVEL: 4

## 2024-05-15 ENCOUNTER — HOSPITAL ENCOUNTER (OUTPATIENT)
Dept: RADIOLOGY | Facility: HOSPITAL | Age: 39
Discharge: HOME | End: 2024-05-15
Payer: COMMERCIAL

## 2024-05-15 DIAGNOSIS — R10.2 PELVIC PAIN: ICD-10-CM

## 2024-05-15 DIAGNOSIS — Z15.01 BRCA2 GENETIC CARRIER: ICD-10-CM

## 2024-05-15 DIAGNOSIS — M06.9 RHEUMATOID ARTHRITIS, INVOLVING UNSPECIFIED SITE, UNSPECIFIED WHETHER RHEUMATOID FACTOR PRESENT (MULTI): ICD-10-CM

## 2024-05-15 DIAGNOSIS — Z15.09 BRCA2 GENETIC CARRIER: ICD-10-CM

## 2024-05-15 PROCEDURE — 76856 US EXAM PELVIC COMPLETE: CPT

## 2024-05-15 PROCEDURE — 76856 US EXAM PELVIC COMPLETE: CPT | Performed by: RADIOLOGY

## 2024-05-15 PROCEDURE — 76830 TRANSVAGINAL US NON-OB: CPT | Performed by: RADIOLOGY

## 2024-05-15 RX ORDER — MELOXICAM 15 MG/1
15 TABLET ORAL DAILY PRN
Qty: 90 TABLET | Refills: 3 | Status: SHIPPED | OUTPATIENT
Start: 2024-05-15 | End: 2025-05-15

## 2024-05-17 ENCOUNTER — TELEPHONE (OUTPATIENT)
Dept: GYNECOLOGIC ONCOLOGY | Facility: HOSPITAL | Age: 39
End: 2024-05-17
Payer: COMMERCIAL

## 2024-05-17 DIAGNOSIS — N83.209 CYST OF OVARY, UNSPECIFIED LATERALITY: Primary | ICD-10-CM

## 2024-05-17 NOTE — TELEPHONE ENCOUNTER
I called the patient with the results of her transvaginal/transabdominal ultrasound. I told her that she did have cysts that are not concerning per her physician. It was recommended that she have a repeat ultrasound in six weeks to ensure the cysts go away. The patient verbalized her understanding and has no further questions at this time.

## 2024-06-25 ENCOUNTER — HOSPITAL ENCOUNTER (OUTPATIENT)
Dept: RADIOLOGY | Facility: HOSPITAL | Age: 39
Discharge: HOME | End: 2024-06-25
Payer: COMMERCIAL

## 2024-06-25 DIAGNOSIS — N83.209 CYST OF OVARY, UNSPECIFIED LATERALITY: ICD-10-CM

## 2024-06-25 PROCEDURE — 76856 US EXAM PELVIC COMPLETE: CPT | Performed by: RADIOLOGY

## 2024-06-25 PROCEDURE — 76856 US EXAM PELVIC COMPLETE: CPT

## 2024-06-25 PROCEDURE — 76830 TRANSVAGINAL US NON-OB: CPT | Performed by: RADIOLOGY

## 2024-06-26 ENCOUNTER — TELEPHONE (OUTPATIENT)
Dept: GYNECOLOGIC ONCOLOGY | Facility: HOSPITAL | Age: 39
End: 2024-06-26
Payer: COMMERCIAL

## 2024-06-26 NOTE — TELEPHONE ENCOUNTER
Patient sent the following [employer] message:  Hi Dr. Olvera, I had my repeat ultrasound this morning. I am continuing to have lower abdominal pain intermittently. Do I need a follow up appointment or will someone call me with my results when they come in? Thank you.

## 2024-07-02 ENCOUNTER — TELEMEDICINE (OUTPATIENT)
Dept: GYNECOLOGIC ONCOLOGY | Facility: CLINIC | Age: 39
End: 2024-07-02
Payer: COMMERCIAL

## 2024-07-02 DIAGNOSIS — N80.9 ENDOMETRIOSIS DETERMINED BY LAPAROSCOPY: Primary | ICD-10-CM

## 2024-07-02 DIAGNOSIS — Z15.09 BRCA2 GENETIC CARRIER: ICD-10-CM

## 2024-07-02 DIAGNOSIS — Z15.01 BRCA2 GENETIC CARRIER: ICD-10-CM

## 2024-07-02 PROCEDURE — 99441 PR PHYS/QHP TELEPHONE EVALUATION 5-10 MIN: CPT | Performed by: STUDENT IN AN ORGANIZED HEALTH CARE EDUCATION/TRAINING PROGRAM

## 2024-07-02 NOTE — PROGRESS NOTES
Patient ID: Sharon Lino is a 38 y.o. female.  Referring Physician: No referring provider defined for this encounter.  Primary Care Provider: Sandra Lind DO    Virtual or Telephone Consent    A telephone visit (audio only) between the patient (at the originating site) and the provider (at the distant site) was utilized to provide this telehealth service.   Verbal consent was requested and obtained from Sharon Lino on this date, 24 for a telehealth visit.       Subjective    Interval History:  Overall doing well does not that she continues having intermittent abdominal pain that is worse around her periods, she notes pain with bowel movements,  pain with intercourse, heavier menstrual bleeding. She does note a Hx of laparoscopically proven endometriosis for which she had a diagnostic laparoscopy and removal of endometriosis. She notes that her Sx were significantly improved after this. She also notes that previously she had tried various forms of hormonal Rxs that did not work well for her.    She denies fever, chills, chest pain, SOB, nausea, vomiting, diarrhea, constipation, dysuria, or any other concerning signs of symptoms.      PMH:  Rheumatoid arthritis   Anxiety      Past Surgical History:  Laparoscopic appendectomy   Double mastectomy with reconstruction  D&C   Tonsillectomy     Family History:    - Father, no cancer diagnosis; BRCA2 mutation positive  - Sister, no cancer diagnosis; BRCA2 mutation positive  - PGM, breast cancer x2, pancreatic cancer, uterine cancer, bladder cancer; BRCA2 mutation positive;   - Paternal great-aunt, breast cancer age 50;   - Paternal great-great grandmother, gastric cancer,  at 53  - No Ashkenazi [employer] ancestry     Social History: Denies current use of smoking, alcohol abuse or recreational drug use.  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     OBGYN History:  The patient is a . History of 2 SVDs.  Menarche age 14. Completed childbearing. Heavy, painful periods every 2-3 weeks  using both pads and tampons changing them every 2-3 hours. Has tried LNG-IUD, OCPs.      Screening:  -Pap smear: 5/2/22 NILM/ HPV-  -Mammogram: undergoing high risk screening with breast surgery  -Colonoscopy: negative    Objective    BSA: There is no height or weight on file to calculate BSA.  There were no vitals taken for this visit.     Physical Exam  US PELVIS TRANSABDOMINAL WITH TRANSVAGINAL  Narrative: Interpreted By:  Lucía Dent,   STUDY:  US PELVIS TRANSABDOMINAL WITH TRANSVAGINAL;  6/25/2024 7:54 am      INDICATION:  Signs/Symptoms:abnormal ultrasound.  LMP 06/17/2024      COMPARISON:  05/15/2024      ACCESSION NUMBER(S):  [id number]      ORDERING CLINICIAN:  HAYDEN DAVIES      TECHNIQUE:  Multiple multiplanar static gray scale, color and spectral waveform  sonographic images of the pelvis were obtained. Transabdominal and  endovaginal ultrasound was performed.      FINDINGS:  UTERUS:  The uterus is  normal in size measuring 6.8 x 5.9 x 4.6 cm in  longitudinal, transverse and AP dimensions respectively. The uterus  is retroflexed. There is a 3.6 x 3.2 x 2.5 cm fundal fibroid      ENDOMETRIUM:  The endometrial canal is  normal in thickness measuring 0.7 cm.      RIGHT ADNEXA:  The right ovary is large in size measuring 7.7 x 5.0 x 4.0 cm.  There is a 4.3 x 4.1 x 3.6 cm cystic area with diffuse low-level  echoes. There are no septations. There is no solid component. This  could represent a hemorrhagic cyst or endometrioma. On 05/16/2024,  this measured 5.0 x 4.2 x 3.8 cm There is an adjacent abutting 2.6 x  2.4 cm cystic area with low-level echoes. There are no septations.  There is no solid component. This could represent a hemorrhagic cyst  or endometrioma. On 05/16/2024, this measured 2.5 x 2.5 x 2.4 cm  Color Doppler spectral wave imaging shows flow.      LEFT ADNEXA:  The left ovary measures 4.1 x 3.2 x 2.5  cm.  There is a 1.4 cm hypoechoic area in the left ovary which could  represent a follicle with low-level echoes and hemorrhage or  endometrioma. On 05/15/2024, this measured 2.1 x 2.0 x 1.8 cm  Color Doppler spectral wave imaging shows flow.      CUL DE SAC:  No gross free fluid is seen in the pelvic cul-de-sac.      Impression: 1. 3.6 cm fundal fibroid.  2. 2 cystic areas in the right ovary with diffuse low-level echoes,  likely hemorrhagic cysts or endometriomas. One of these is smaller in  one of these is unchanged.  3. 1.4 cm cystic area with low-level echoes in the left ovary, likely  a hemorrhagic follicle or small endometrioma. This is smaller.      MACRO:  None      Signed by: Lucía Dent 2024 9:47 AM  Dictation workstation:   [id number]        Performance Status:  Asymptomatic    Assessment/Plan     Oncology History    No history exists.   38 y.o.   here with BRCA2+ here for imaging review  Comorbidities RA, anxiety   ECO     # BRCA2+  - We reviewed cancers associated with the BRCA mutation, and their relative risks. Specifically we discussed an approximately 15-30% lifetime risk of developing fallopian, ovarian or primary peritoneal  cancer.   - We discussed that there is no effective screening for ovarian cancer  - We discussed preventative measures such as COCs  - We discussed the recommendation for rrBSO at the age 40-45 years  - We discussed that in about 12% of rrBSO surgeries an occult malignancy is discovered  - We discussed that unfortunately screening with  and pelvic US are not recommended but can be considered should symptoms change with time  - She would like to defer rrBSO until age 41 which is very reasonable  - We reviewed her ultrasound which shows stable to improved cystic masses concerning for endometriomas. Sx consistent with endometriosis patient does have a Hx of biopsy proven endometriosis.      # HMB   - We discussed no association between BRCA2+ and uterine  cancer   - S/p endometrial ablation  - She understands limitations with endometrial sampling after an ablation and should concern arise for malignancy would recommend hysterectomy   - Patient presenting with a 2 week period of LLQ pain, has had pain like this previously with cysts however it has never lasted this long.  - imaging reviewed which shows decreased in size of adnexal masses, suspect endometriosis    # Endometriosis   - We briefly reviewed options including combined oral contraceptives, she declines this currently as she has not previously tolerated hormonal Rxs well, we also discussed surgical intervention, and referral to [facility] providers for managment of her endometriosis.  - Plan for referral to [facility] for endometriosis management, will place referral    I spent 10 minutes virtually or in a telephone with this patient and/or family. More than 50% of the time was spent in counseling and/or coordination of care.      Scribe Attestation  By signing my name below, I, Kermit Interiano   attest that this documentation has been prepared under the direction and in the presence of Nicky Olvera MD.     Provider Attestation - Scribe documentation    All medical record entries made by the Scribe were at my direction and personally dictated by me. I have reviewed the chart and agree that the record accurately reflects my personal performance of the history, physical exam, discussion and plan.    Nicky Olvera MD

## 2024-08-06 NOTE — PROGRESS NOTES
Division of Minimally Invasive Gynecologic Surgery  [facility]    24 Gynecology Visit    CC:   Chief Complaint   Patient presents with    Consult     Consult for ovarian cysts  LMP: 24  PAP:22, WNL, HPV-NEG       Sharon Lino is a 38 y.o. referred to our practice by Dr. Olvera for BRCA 2 +and history of endometriosis    Patient was diagnosed with BRCA 2 mutation and presents for discussion of risk reducing surgery. She is s/p prophylactic mastectomy. She also has a h/o biopsy proven endometriosis.   S/p lpsc with Dr. Dodson in her 20s which helped her pain. She also reports a long h/o heavy periods and is s/p ablation in  for HMB.   Has significant pain worsened by her periods. She still has a periods once a month that is light, but is very painful. Patient is ready for surgery.    GI symptoms: pain with cycle  Urinary symptoms: none  Sexual activity: has pain with deep penetration. Worse closer to menses    Prior Therapies: Lng IUD, OCPs    Imaging:   --TVUS: The uterus is normal in size measuring 6.8 x 5.9 x 4.6 cm. There is a 3.6 x 3.2 x 2.5 cm fundal fibroid. The right ovary is large in size measuring 7.7 x 5.0 x 4.0 cm. There is a 4.3 x 4.1 x 3.6 cm cystic area with diffuse low-level  echoes. There are no septations. There is no solid component. This could represent a hemorrhagic cyst or endometrioma. On 2024, this measured 5.0 x 4.2 x 3.8 cm There is an adjacent abutting 2.6 x  2.4 cm cystic area with low-level echoes. There are no septations. There is no solid component. This could represent a hemorrhagic cyst or endometrioma. On 2024, this measured 2.5 x 2.5 x 2.4 cm  Color Doppler spectral wave imaging shows flow.    GYN Hx  Gynecologic history:  Contraception/menstrual regulation:  partner vasectomy  Desires Childbearing: denies  Last pap: NILM neg HPV   Grandmother with breast and pancreatic cancer. Dad + BRCA.     OBHx:  x  2  Pertinent PMH: BRCA 2, endometriosis  Pertient PSH: lpsc appendectomy. Lpsc EOE. bilateral mastectomy in 2020    PMHx, PSHx, SHx, Allergies, and Medications updated in Epic.  Past Medical History:   Diagnosis Date    Depression     Genetic susceptibility to malignant neoplasm of breast 05/17/2017    BRCA2 positive    GERD (gastroesophageal reflux disease)     Gestational diabetes mellitus in pregnancy, unspecified control ([facility]) 05/17/2017    Gestational diabetes mellitus (GDM) affecting pregnancy, antepartum    Other acute postprocedural pain 08/04/2020    Post-op pain    Personal history of other diseases of the female genital tract 05/17/2017    History of endometriosis    Personal history of other diseases of the musculoskeletal system and connective tissue 05/17/2017    History of rheumatoid arthritis     Past Surgical History:   Procedure Laterality Date    DILATION AND CURETTAGE OF UTERUS  10/16/2018    Dilation And Curettage    LAPAROSCOPY DIAGNOSTIC / BIOPSY / ASPIRATION / LYSIS  05/17/2017    Laparoscopy (Diagnostic)    OTHER SURGICAL HISTORY  09/27/2021    Mastectomy    OTHER SURGICAL HISTORY  09/27/2021    Breast reconstruction    TONSILLECTOMY  05/17/2017    Tonsillectomy     No Known Allergies    Current Outpatient Medications:     citalopram (CeleXA) 10 mg tablet, Take 1 tablet (10 mg) by mouth once daily., Disp: 90 tablet, Rfl: 3    meloxicam (Mobic) 15 mg tablet, Take 1 tablet (15 mg) by mouth once daily as needed for moderate pain (4 - 6)., Disp: 90 tablet, Rfl: 3    Mounjaro 2.5 mg/0.5 mL pen injector, INJECT 2.5MG SUBCUTANEOUSLY ONCE EVERY WEEK, Disp: , Rfl:     omeprazole (PriLOSEC) 40 mg DR capsule, Take 1 capsule (40 mg) by mouth 2 times a day before meals. Do not crush or chew., Disp: 60 capsule, Rfl: 1  Social History     Socioeconomic History    Marital status:      Spouse name: Not on file    Number of children: Not on file    Years of education: Not on file    Highest  "education level: Not on file   Occupational History    Not on file   Tobacco Use    Smoking status: Former     Current packs/day: 0.00     Types: Cigarettes     Quit date:      Years since quittin.6    Smokeless tobacco: Never   Vaping Use    Vaping status: Never Used   Substance and Sexual Activity    Alcohol use: Yes     Comment: rarely    Drug use: Never    Sexual activity: Not on file   Other Topics Concern    Not on file   Social History Narrative    Not on file     Social Determinants of Health     Financial Resource Strain: Not on file   Food Insecurity: Not on file   Transportation Needs: Not on file   Physical Activity: Not on file   Stress: Not on file   Social Connections: Not on file   Intimate Partner Violence: Not on file   Housing Stability: Not on file     No family history on file.  OB History    No obstetric history on file.            ROS: reviewed and negative    PE:/73   Pulse 79   Ht 1.727 m (5' 8\")   Wt 85.7 kg (189 lb)   BMI 28.74 kg/m²   Gen: NAD  Psych: AAOx3  Skin: no lesions  Pulm: nonlabored breathing, normal respiratory effort  Cards: normal peripheral perfusion  Lymph: no LAD in axilla or groin  GI: soft, non-tender, non-distended, no rebound/guarding, no masses  : deferred      A/P: Sharon Lino is a 38 y.o. with BRCA 2 mutation, h/o endometriosis, AUB and pelvic pain    BRCA2 mutation  - We reviewed cancers associated with the BRCA2 mutation, and their relative risks. Specifically we discussed an approximately  13 to 25% lifetime risk of developing fallopian, ovarian or primary peritoneal cancer.   - We discussed that there is no effective screening for ovarian cancer  - We discussed preventative measures such as COCs  - We discussed the recommendation for rrBSO at the age of 40-45 with BRCA 2 or once childbearing is complete  - We discussed that in about 12% of rrBSO surgeries an occult malignancy is discovered  - She is interested in rrBSO at this time.  " Discussed r/b of hysterectomy at the time of BSO. Discussed that hysterectomy would allow for estrogen only HRT which may decrease risk for breast cancer compared to E/P HRT, although data is not robust. Hysterectomy may help pain related to a uterine source, decrease interval to endometriosis recurrence and from pain related to prior ablation  - Discussed that HRT is generally considered safe in BRCA patient's without a known cancer diagnosis and the risk of HRT must be balanced with the risks of surgical menopause including early mortality, cardiovascular effects and osteoporosis. She is accepting of  estrogen only HRT.   - She desires rrBSO as well as hysterectomy for endometriosis    History of endometriosis  - We  discussed the multiple etiologies of chronic pelvic pain, including endometriosis, adenomyosis, GI etiologies, MSK etiologies, and centralization of pain.   - we also discussed pain may partially also be related to prior ablation which would require hysterectomy  -Regarding endometriosis, we discussed the natural history of the disease, superficial endometriosis, endometriomas, and deeply infiltrating disease. Extensively reviewed the medical and surgical treatment options available for endometriosis including NSAIDs, OCPs, progestin therapy (Mirena IUD, depo-provera, norethindrone), GnRH agonists, GnRH antagonists, aromatase inhibitors (i.e. Letrozole), Orilissa (Elagolix tablets), as well as the surgical options including excision of endometriosis alone, excision of endometriosis +/- Mirena IUD +/- appendectomy, and a hysterectomy with preservation of her ovaries. We reviewed at length the modern treatment of endometriosis, recommending preservation of ovaries if normal, despite the presence of extensive disease due to morbidity and mortality benefits.  - We discussed the natural history of endometriosis and the fact that hormonal suppression is thought to have a role in slowing disease progression  and managing symptoms of endometriosis, but cannot definitively reverse or eliminate endometriosis.   - She has tried multiple medical approaches and has not gotten adequate relief.   - I do think surgical excision is appropriate for this patient, and she desires to proceed with an aggressive surgical approach. We did however discuss that given the likelihood of pelvic floor dysfunction and the possibility of centralization of pain, she may not experience complete relief from surgery. She is aware she may require further treatment for these etiologies of pain after surgery.       She will be posted for robotic total laparoscopic hysterectomy, bilateral salpingoophorectomy, excision of endometriosis, cystoscopy and all indicated procedures    RTC postop    Dina Wong MD  Division of Minimally Invasive Gynecologic Surgery  [facility]

## 2024-08-08 ENCOUNTER — PATIENT MESSAGE (OUTPATIENT)
Dept: OBSTETRICS AND GYNECOLOGY | Facility: CLINIC | Age: 39
End: 2024-08-08

## 2024-08-08 ENCOUNTER — OFFICE VISIT (OUTPATIENT)
Dept: OBSTETRICS AND GYNECOLOGY | Facility: CLINIC | Age: 39
End: 2024-08-08
Payer: COMMERCIAL

## 2024-08-08 VITALS
HEART RATE: 79 BPM | HEIGHT: 68 IN | DIASTOLIC BLOOD PRESSURE: 73 MMHG | BODY MASS INDEX: 28.64 KG/M2 | WEIGHT: 189 LBS | SYSTOLIC BLOOD PRESSURE: 109 MMHG

## 2024-08-08 DIAGNOSIS — Z15.01 BRCA2 GENETIC CARRIER: ICD-10-CM

## 2024-08-08 DIAGNOSIS — Z15.09 BRCA2 GENETIC CARRIER: ICD-10-CM

## 2024-08-08 DIAGNOSIS — R10.2 PELVIC PAIN: ICD-10-CM

## 2024-08-08 DIAGNOSIS — N80.9 ENDOMETRIOSIS DETERMINED BY LAPAROSCOPY: Primary | ICD-10-CM

## 2024-08-08 PROCEDURE — 99215 OFFICE O/P EST HI 40 MIN: CPT | Performed by: STUDENT IN AN ORGANIZED HEALTH CARE EDUCATION/TRAINING PROGRAM

## 2024-08-08 RX ORDER — PHENAZOPYRIDINE HYDROCHLORIDE 200 MG/1
200 TABLET, FILM COATED ORAL ONCE
OUTPATIENT
Start: 2024-08-08 | End: 2024-08-08

## 2024-08-08 RX ORDER — GABAPENTIN 600 MG/1
600 TABLET ORAL ONCE
OUTPATIENT
Start: 2024-08-08 | End: 2024-08-08

## 2024-08-08 RX ORDER — CELECOXIB 400 MG/1
400 CAPSULE ORAL ONCE
OUTPATIENT
Start: 2024-08-08 | End: 2024-08-08

## 2024-08-08 RX ORDER — ACETAMINOPHEN 325 MG/1
975 TABLET ORAL ONCE
OUTPATIENT
Start: 2024-08-08 | End: 2024-08-08

## 2024-08-08 ASSESSMENT — PAIN SCALES - GENERAL: PAINLEVEL: 3

## 2024-08-09 NOTE — PATIENT COMMUNICATION
Returned call. Originally planned for BSO at time of surgery for BRCA. However patient called and is worried about menopause at her age and HRT. Would like to keep the left ovary if it looks normal to prevent the need for HRT and menopause. Patient is aware that she will need another surgery down the line to have the other ovary removed for risk reduction. However, feel it is very reasonable to keep the left ovary if it looks normal given that she is only 38 and recommendation is for removal at 40-45 to prevent need for HRT and menopausal side effects. Will still be having risk reduction with BS and RO.  Discussed we will plan to conserve the left ovary and otherwise proceed with surgery as planned. Patient is in agreement with plan of care.

## 2024-08-12 DIAGNOSIS — N80.9 ENDOMETRIOSIS DETERMINED BY LAPAROSCOPY: Primary | ICD-10-CM

## 2024-08-29 ENCOUNTER — CLINICAL SUPPORT (OUTPATIENT)
Dept: PREADMISSION TESTING | Facility: HOSPITAL | Age: 39
End: 2024-08-29
Payer: COMMERCIAL

## 2024-08-29 NOTE — CPM/PAT NURSE NOTE
CPM/PAT Nurse Note      Name: Shaorn Lino (Sharon Lino)  /Age: 1985/39 y.o.     Sharon Lino is scheduled for Robotic assisted total laparoscopic hysterectomy, bilateral salpingoophorectomy, exicsion of endometriosis, cystoscopy on 24    **Data Input  Past Medical History:   Diagnosis Date    Depression     Genetic susceptibility to malignant neoplasm of breast 2017    BRCA2 positive    GERD (gastroesophageal reflux disease)     Other acute postprocedural pain 2020    Post-op pain    Personal history of other diseases of the female genital tract 2017    History of endometriosis    Rheumatoid arthritis (Multi)        Past Surgical History:   Procedure Laterality Date    DILATION AND CURETTAGE OF UTERUS  10/16/2018    Dilation And Curettage    LAPAROSCOPY DIAGNOSTIC / BIOPSY / ASPIRATION / LYSIS  2017    Laparoscopy (Diagnostic)    MASTECTOMY      OTHER SURGICAL HISTORY  2021    Breast reconstruction    TONSILLECTOMY  2017    Tonsillectomy       Patient  has no history on file for sexual activity.    No family history on file.    No Known Allergies    Prior to Admission medications    Medication Sig Start Date End Date Taking? Authorizing Provider   citalopram (CeleXA) 10 mg tablet Take 1 tablet (10 mg) by mouth once daily. 23  Sandra Lind DO   meloxicam (Mobic) 15 mg tablet Take 1 tablet (15 mg) by mouth once daily as needed for moderate pain (4 - 6). 5/15/24 5/15/25  Sandra Lind DO   Mounjaro 2.5 mg/0.5 mL pen injector INJECT 2.5MG SUBCUTANEOUSLY ONCE EVERY WEEK 23   Historical Provider, MD   omeprazole (PriLOSEC) 40 mg DR capsule Take 1 capsule (40 mg) by mouth 2 times a day before meals. Do not crush or chew. 3/21/24 5/20/24  Ashutosh Kimball MD        PAT ROS     DASI Risk Score    No data to display       Caprini DVT Assessment    No data to display       Modified Frailty Index    No data to display        CHADS2 Stroke Risk  Current as of 7 hours ago        N/A 3 to 100%: High Risk   2 to < 3%: Medium Risk   0 to < 2%: Low Risk     Last Change: N/A          This score determines the patient's risk of having a stroke if the patient has atrial fibrillation.        This score is not applicable to this patient. Components are not calculated.          Revised Cardiac Risk Index    No data to display       Apfel Simplified Score    No data to display       Risk Analysis Index Results This Encounter    No data found in the last 1 encounters.     Providers:                                                                                                           PCP: Sandra Lind, 24  Hx of BRCA2+, post Double mastectomy with reconstruction     Gastroenterology: Ashutosh Bethhenryerasto, 25 presents for GERD (Pt reports feeling like food is getting stuck when swallowing/regurgitating food and a burning sensation coming up her throat.     Gynecologic: Dina Wong 25 Presents  for BRCA 2 +and history of endometriosis. s/p prophylactic mastectomy  And lpsc with Dr. Dodson in her 20s which helped her pain. Referred by Dr. Nicky Olvera.      --TESTING:      - EK24 at [facility], see media  NORMAL SINUS RHYTHM   NONSPECIFIC T WAVE ABNORMALITY   ABNORMAL ECG     - CT A/P: 23 at [facility]  IMPRESSION:   There are 2 adjacent right adnexal cysts measuring 4.7 cm and 4.0 cm.   This can be further evaluated with pelvic ultrasound if clinically   indicated.   Small fat-containing umbilical hernia.  Mild associated fat stranding.     - US Pelvis transvaginal: 24  IMPRESSION:  1. 3.6 cm fundal fibroid.  2. 2 cystic areas in the right ovary with diffuse low-level echoes,  likely hemorrhagic cysts or endometriomas. One of these is smaller in  one of these is unchanged.  3. 1.4 cm cystic area with low-level echoes in the left ovary, likely  a hemorrhagic follicle or small endometrioma. This is  smaller.        _______________________________________________________________  Medication instructions:   Instructed to hold Vitamins, Supplements and Ibuprofen 7 days prior to surgery       Vanna Samaniego LPN  Preadmission Testing

## 2024-09-05 ENCOUNTER — PRE-ADMISSION TESTING (OUTPATIENT)
Dept: PREADMISSION TESTING | Facility: HOSPITAL | Age: 39
End: 2024-09-05
Payer: COMMERCIAL

## 2024-09-05 VITALS
HEIGHT: 67 IN | SYSTOLIC BLOOD PRESSURE: 105 MMHG | OXYGEN SATURATION: 97 % | DIASTOLIC BLOOD PRESSURE: 71 MMHG | BODY MASS INDEX: 29.24 KG/M2 | HEART RATE: 95 BPM | WEIGHT: 186.29 LBS | TEMPERATURE: 97.3 F

## 2024-09-05 DIAGNOSIS — N80.9 ENDOMETRIOSIS DETERMINED BY LAPAROSCOPY: ICD-10-CM

## 2024-09-05 LAB
ABO GROUP (TYPE) IN BLOOD: NORMAL
ANTIBODY SCREEN: NORMAL
ERYTHROCYTE [DISTWIDTH] IN BLOOD BY AUTOMATED COUNT: 12.7 % (ref 11.5–14.5)
HCT VFR BLD AUTO: 43.2 % (ref 36–46)
HGB BLD-MCNC: 14.2 G/DL (ref 12–16)
MCH RBC QN AUTO: 28 PG (ref 26–34)
MCHC RBC AUTO-ENTMCNC: 32.9 G/DL (ref 32–36)
MCV RBC AUTO: 85 FL (ref 80–100)
NRBC BLD-RTO: 0 /100 WBCS (ref 0–0)
PLATELET # BLD AUTO: 379 X10*3/UL (ref 150–450)
RBC # BLD AUTO: 5.08 X10*6/UL (ref 4–5.2)
RH FACTOR (ANTIGEN D): NORMAL
WBC # BLD AUTO: 6.9 X10*3/UL (ref 4.4–11.3)

## 2024-09-05 PROCEDURE — 36415 COLL VENOUS BLD VENIPUNCTURE: CPT

## 2024-09-05 PROCEDURE — 86901 BLOOD TYPING SEROLOGIC RH(D): CPT

## 2024-09-05 PROCEDURE — 86900 BLOOD TYPING SEROLOGIC ABO: CPT

## 2024-09-05 PROCEDURE — 85027 COMPLETE CBC AUTOMATED: CPT

## 2024-09-05 PROCEDURE — 99204 OFFICE O/P NEW MOD 45 MIN: CPT | Performed by: NURSE PRACTITIONER

## 2024-09-05 RX ORDER — OMEPRAZOLE 20 MG/1
20 TABLET, DELAYED RELEASE ORAL
COMMUNITY

## 2024-09-05 ASSESSMENT — ENCOUNTER SYMPTOMS
ENDOCRINE NEGATIVE: 1
CARDIOVASCULAR NEGATIVE: 1
NEUROLOGICAL NEGATIVE: 1
CONSTITUTIONAL NEGATIVE: 1
NECK NEGATIVE: 1
EYES NEGATIVE: 1
RESPIRATORY NEGATIVE: 1
GASTROINTESTINAL NEGATIVE: 1
MUSCULOSKELETAL NEGATIVE: 1

## 2024-09-05 ASSESSMENT — DUKE ACTIVITY SCORE INDEX (DASI)
CAN YOU DO HEAVY WORK AROUND THE HOUSE LIKE SCRUBBING FLOORS OR LIFTING AND MOVING HEAVY FURNITURE: YES
CAN YOU DO MODERATE WORK AROUND THE HOUSE LIKE VACUUMING, SWEEPING FLOORS OR CARRYING GROCERIES: YES
CAN YOU RUN A SHORT DISTANCE: YES
CAN YOU WALK A BLOCK OR TWO ON LEVEL GROUND: YES
CAN YOU PARTICIPATE IN MODERATE RECREATIONAL ACTIVITIES LIKE GOLF, BOWLING, DANCING, DOUBLES TENNIS OR THROWING A BASEBALL OR FOOTBALL: YES
CAN YOU PARTICIPATE IN STRENOUS SPORTS LIKE SWIMMING, SINGLES TENNIS, FOOTBALL, BASKETBALL, OR SKIING: YES
CAN YOU WALK INDOORS, SUCH AS AROUND YOUR HOUSE: YES
CAN YOU DO LIGHT WORK AROUND THE HOUSE LIKE DUSTING OR WASHING DISHES: YES
CAN YOU CLIMB A FLIGHT OF STAIRS OR WALK UP A HILL: YES
CAN YOU TAKE CARE OF YOURSELF (EAT, DRESS, BATHE, OR USE TOILET): YES
CAN YOU DO YARD WORK LIKE RAKING LEAVES, WEEDING OR PUSHING A MOWER: YES

## 2024-09-05 ASSESSMENT — LIFESTYLE VARIABLES: SMOKING_STATUS: NONSMOKER

## 2024-09-05 NOTE — CPM/PAT H&P
CPM/PAT Evaluation       Name: Sharon Lino (Sharon Lino)  /Age: 1985/39 y.o.     Visit Type:   In-Person       Chief Complaint: risk reduction surgery, preop eval     HPI  The patient is a 39 year old female with history of BRCA 2 +, endometriosis and uterine fibroid and AUB. She presents today for perioperative evaluation in anticipation of Robotic assisted total laparoscopic hysterectomy, bilateral salpingoophorectomy, exicsion of endometriosis, cystoscopy and all indicated procedures on 24 with Dr. Wong.    Past Medical History:   Diagnosis Date    Depression     Dysfunctional uterine bleeding     Dysphagia     Esophageal dysphagia    Fibroid     Genetic susceptibility to malignant neoplasm of breast     BRCA2 positive, s/p b/l Mastectomy    GERD (gastroesophageal reflux disease)     HELLP syndrome (HHS-HCC)         History of blood transfusion      after giving birth    History of endometriosis     Other acute postprocedural pain 2020    Post-op pain    PONV (postoperative nausea and vomiting)     Rheumatoid arthritis (Multi)     Rheumatoid arthritis (Multi)        Past Surgical History:   Procedure Laterality Date    APPENDECTOMY      DILATION AND CURETTAGE OF UTERUS  10/16/2018    Dilation And Curettage    ESOPHAGOGASTRODUODENOSCOPY      LAPAROSCOPY DIAGNOSTIC / BIOPSY / ASPIRATION / LYSIS  2017    Laparoscopy (Diagnostic)    MASTECTOMY Bilateral     bilateral mastectomy in     OTHER SURGICAL HISTORY  2021    Breast reconstruction    OTHER SURGICAL HISTORY      Novasure ablation    TONSILLECTOMY  2017    Tonsillectomy       Patient  has no history on file for sexual activity.    Family History   Problem Relation Name Age of Onset    Hypertension Mother         No Known Allergies    Prior to Admission medications    Medication Sig Start Date End Date Taking? Authorizing Provider   citalopram (CeleXA) 10 mg tablet Take 1 tablet (10 mg) by mouth once  daily. 12/27/23 12/26/24  Sandra Lind DO   meloxicam (Mobic) 15 mg tablet Take 1 tablet (15 mg) by mouth once daily as needed for moderate pain (4 - 6). 5/15/24 5/15/25  Sandra Lind DO   Mounjaro 2.5 mg/0.5 mL pen injector INJECT 2.5MG SUBCUTANEOUSLY ONCE EVERY WEEK 9/25/23   Historical Provider, MD   omeprazole (PriLOSEC) 40 mg DR capsule Take 1 capsule (40 mg) by mouth 2 times a day before meals. Do not crush or chew. 3/21/24 5/20/24  Ashutosh Kimball MD        PAT ROS:   Constitutional:   neg    Neuro/Psych:   neg    Eyes:   neg    Ears:   neg    Nose:   neg    Mouth:   neg    Throat:   neg    Neck:   neg    Cardio:   neg    Respiratory:   neg    Endocrine:   neg    GI:   neg    :   neg    Musculoskeletal:   neg    Hematologic:   neg    Skin:  neg        Physical Exam  Vitals reviewed.   Constitutional:       Appearance: Normal appearance.   HENT:      Head: Normocephalic and atraumatic.      Nose: Nose normal.      Mouth/Throat:      Mouth: Mucous membranes are moist.      Pharynx: Oropharynx is clear.   Eyes:      Extraocular Movements: Extraocular movements intact.      Conjunctiva/sclera: Conjunctivae normal.      Pupils: Pupils are equal, round, and reactive to light.   Cardiovascular:      Rate and Rhythm: Normal rate and regular rhythm.      Pulses: Normal pulses.      Heart sounds: Normal heart sounds.   Pulmonary:      Effort: Pulmonary effort is normal.      Breath sounds: Normal breath sounds.   Musculoskeletal:         General: Normal range of motion.      Cervical back: Normal range of motion.   Skin:     General: Skin is warm and dry.   Neurological:      General: No focal deficit present.      Mental Status: She is alert and oriented to person, place, and time.   Psychiatric:         Mood and Affect: Mood normal.         Behavior: Behavior normal.          PAT AIRWAY:   Airway:     Mallampati::  II    TM distance::  >3 FB    Neck ROM::  Full  normal        Visit Vitals  BP  "105/71   Pulse 95   Temp 36.3 °C (97.3 °F) (Temporal)   Ht 1.702 m (5' 7\")   Wt 84.5 kg (186 lb 4.6 oz)   SpO2 97%   BMI 29.18 kg/m²   OB Status Ablation   Smoking Status Former   BSA 2 m²          DASI Risk Score    No data to display       Caprini DVT Assessment      Flowsheet Row Most Recent Value   DVT Score 7   Current Status Major surgery planned, lasting over 3 hours   History Prior major surgery   Age Less than 40 years   BMI 30 or less          Modified Frailty Index      Flowsheet Row Most Recent Value   Modified Frailty Index Calculator 0          CHADS2 Stroke Risk  Current as of 2 hours ago        N/A 3 to 100%: High Risk   2 to < 3%: Medium Risk   0 to < 2%: Low Risk     Last Change: N/A          This score determines the patient's risk of having a stroke if the patient has atrial fibrillation.        This score is not applicable to this patient. Components are not calculated.          Revised Cardiac Risk Index      Flowsheet Row Most Recent Value   Revised Cardiac Risk Calculator 0          Apfel Simplified Score      Flowsheet Row Most Recent Value   Apfel Simplified Score Calculator 3          Risk Analysis Index Results This Encounter    No data found in the last 1 encounters.       Stop Bang Score      Flowsheet Row Most Recent Value   Do you snore loudly? 0   Do you often feel tired or fatigued after your sleep? 0   Has anyone ever observed you stop breathing in your sleep? 0   Do you have or are you being treated for high blood pressure? 0   Recent BMI (Calculated) 28.7   Is BMI greater than 35 kg/m2? 0=No   Age older than 50 years old? 0=No   Is your neck circumference greater than 17 inches (Male) or 16 inches (Female)? 0   Gender - Male 0=No   STOP-BANG Total Score 0          Recent Results (from the past 504 hour(s))   Type And Screen    Collection Time: 09/05/24  9:51 AM   Result Value Ref Range    ABO TYPE AB     Rh TYPE NEG     ANTIBODY SCREEN NEG    CBC    Collection Time: 09/05/24  " 9:51 AM   Result Value Ref Range    WBC 6.9 4.4 - 11.3 x10*3/uL    nRBC 0.0 0.0 - 0.0 /100 WBCs    RBC 5.08 4.00 - 5.20 x10*6/uL    Hemoglobin 14.2 12.0 - 16.0 g/dL    Hematocrit 43.2 36.0 - 46.0 %    MCV 85 80 - 100 fL    MCH 28.0 26.0 - 34.0 pg    MCHC 32.9 32.0 - 36.0 g/dL    RDW 12.7 11.5 - 14.5 %    Platelets 379 150 - 450 x10*3/uL        Diagnostic Results          - CT A/P: [facility]/26/23 at [facility]  IMPRESSION:   There are 2 adjacent right adnexal cysts measuring 4.7 cm and 4.0 cm.   This can be further evaluated with pelvic ultrasound if clinically   indicated.   Small fat-containing umbilical hernia.  Mild associated fat stranding.      - US Pelvis transvaginal: 06/25/24  IMPRESSION:  1. 3.6 cm fundal fibroid.  2. 2 cystic areas in the right ovary with diffuse low-level echoes,  likely hemorrhagic cysts or endometriomas. One of these is smaller in  one of these is unchanged.  3. 1.4 cm cystic area with low-level echoes in the left ovary, likely  a hemorrhagic follicle or small endometrioma. This is smaller.     Assessment and Plan:     Anesthesia:  The patient notes anesthesia complications in the past related to PONV.    Neuro:   The patient has diagnoses or significant findings on chart review or clinical presentation and evaluation significant for depression managed on Celexa, continue as prescribed in the perioperative period. The patient is at increased risk for postoperative delirium secondary to depression. The patient is at increased risk for perioperative stroke secondary to female gender, general anesthesia, operative time >2.5 hours. Handouts for preoperative brain exercises given to patient.    HEENT/Airway  No diagnoses, significant findings on chart review, clinical presentation, or evaluation. No documented or reported history of airway difficulty.     Cardiovascular  No diagnoses or significant findings on chart review or clinical presentation and evaluation.  She is scheduled for non-cardiac surgery  associated with elevated risk. The patient has no major cardiac contraindications to non- cardiac surgery.  RCRI  The patient meets 0-1 RCRI criteria and therefore has a less than 1% risk of major adverse cardiac complications.  METS  The patient's functional capacity capacity is greater than 4 METS.  The patient has a 30-day risk for MACE of 0 predictors, 3.9% risk for cardiac death, nonfatal myocardial infarction, and nonfatal cardiac arrest.  MERCEDES score which indicates a 0.1% risk of intraoperative or 30-day postoperative MACE (major adverse cardiac event).  Cardiology Evaluation  The patient is not followed by cardiology.    Pulmonary   No significant findings on chart review or clinical presentation and evaluation.    The patient has a stop bang score of 0, which places patient at low risk for having JEAN MARIE.    ARISCAT 0, low, 1.6% risk of in-hospital postoperative pulmonary complications  PRODIGY 3, low risk of respiratory depression episode. Patient given PI sheet for preoperative deep breathing exercises.    Hematology  No diagnoses or significant findings on chart review or clinical presentation and evaluation.    Caprini score 7, high risk of perioperative VTE.     Patient instructed to ambulate as soon as possible postoperatively to decrease thromboembolic risk. Initiate mechanical DVT prophylaxis as soon as possible and initiate chemical prophylaxis when deemed safe from a bleeding standpoint post surgery.     Transfusion Evaluation  A type and screen was obtained given the likelihood for perioperative transfusion of blood or blood products.    Gastrointestinal  The patient has diagnoses or significant findings on chart review or clinical presentation and evaluation significant for GERD, dysphagia. Currently on Omeprazole. Follows with gastroenterology.  Gastroenterology: Ashutosh Kimball, 03/21/24 presents for GERD (Pt reports feeling like food is getting stuck when swallowing/regurgitating food and a  burning sensation coming up her throat.     Eat 10- 0,  self-perceived oropharyngeal dysphagia scale (0-40)     Genitourinary  The patient has diagnoses or significant findings on chart review or clinical presentation and evaluation significant for history of BRCA2 positive s/p prophylactic mastectomy, uterine fibroid, endometriosis, history of AUB. Scheduled for surgery with Dr. Wong on 9/17/24.     Renal  The patient has no known history of chronic kidney disease. No renal diagnoses or significant findings on chart review or clinical presentation and evaluation.    Musculoskeletal  The patient has history of RA followed by PCP. Managed on Meloxicam. Will hold 7 days prior to surgery.    Endocrine  No diagnoses or significant findings on chart review or clinical presentation and evaluation.    ID  No diagnoses or significant findings on chart review or clinical presentation and evaluation.    -Preoperative medication instructions were provided and reviewed with the patient.  Any additional testing or evaluation was explained to the patient.  NPO Instructions were discussed, and the patient's questions were answered prior to conclusion of this encounter. Patient verbalized understanding of preoperative instructions. After Visit Summary given.

## 2024-09-05 NOTE — PREPROCEDURE INSTRUCTIONS
Fasting Guidelines    NPO Instructions:    Do not eat any food after midnight the night before your surgery/procedure.  You may have up to 13.5 ounces of clear liquids until TWO hours before your instructed arrival time to the hospital. This includes water, black tea/coffee, (no milk or cream), apple juice, and/or electrolyte drinks (Gatorade).  You may chew gum up to TWO hours before your surgery/procedure.    Additional Instructions:    Avoid herbal supplements, multivitamins and NSAIDS (non-steroidal anti-inflammatory drugs) such as Advil, Aleve, Ibuprofen, Naproxen, Excedrin, Meloxicam or Celebrex for at least 7 days prior to surgery. May take Tylenol as needed.    Avoid tobacco and alcohol products for 24 hours prior to surgery.    CONTACT SURGEON'S OFFICE IF YOU DEVELOP:  * Fever = 100.4 F   * New respiratory symptoms (e.g. cough, shortness of breath, respiratory distress, sore throat)  * Recent loss of taste or smell  *Flu like symptoms such as headache, fatigue or gastrointestinal symptoms  * You develop any open sores, shingles, burning or painful urination   AND/OR:  * You no longer wish to have the surgery.  * Any other personal circumstances change that may lead to the need to cancel or defer this surgery.  *You were admitted to any hospital within one week of your planned procedure.    Seven/Six Days before Surgery:  Review your medication instructions, stop indicated medications    Day of Surgery:  Review your medication instructions, take indicated medications  Wear comfortable loose fitting clothing  Do not use moisturizers, creams, lotions or perfume  All jewelry and valuables should be left at home    [facility]  [facility]  [phone]  [phone]  [email or web address]      Preoperative Brain Exercises    What are brain exercises?  A brain exercise is any activity that engages your thinking (cognitive) skills.    What types of  activities are considered brain exercises?  Jigsaw puzzles, crossword puzzles, word jumble, memory games, word search, and many more.  Many can be found free online or on your phone via a mobile michael.    Why should I do brain exercises before my surgery?  More recent research has shown brain exercise before surgery can lower the risk of postoperative delirium (confusion) which can be especially important for older adults.  Patients who did brain exercises for 5 to 10 hours the days before surgery, cut their risk of postoperative delirium in half up to 1 week after surgery.         [facility]    Preoperative Deep Breathing Exercises    Why it is important to do deep breathing exercises before my surgery?  Deep breathing exercises strengthen your breathing muscles.  This helps you to recover after your surgery and decreases the chance of breathing complications.      How are the deep breathing exercises done?  Sit straight with your back supported.  Breathe in deeply and slowly through your nose. Your lower rib cage should expand and your abdomen may move forward.  Hold that breath for 3 to 5 seconds.  Breathe out through pursed lips, slowly and completely.  Rest and repeat 10 times every hour while awake.  Rest longer if you become dizzy or lightheaded.         Patient and Family Education             Ways You Can Help Prevent Blood Clots             This handout explains some simple things you can do to help prevent blood clots.      Blood clots are blockages that can form in the body's veins. When a blood clot forms in your deep veins, it may be called a deep vein thrombosis, or DVT for short. Blood clots can happen in any part of the body where blood flows, but they are most common in the arms and legs. If a piece of a blood clot breaks free and travels to the lungs, it is called a pulmonary embolus (PE). A PE can be a very serious problem.         Being in the hospital or having surgery  can raise your chances of getting a blood clot because you may not be well enough to move around as much as you normally do.         Ways you can help prevent blood clots in the hospital         Wearing SCDs. SCDs stands for Sequential Compression Devices.   SCDs are special sleeves that wrap around your legs  They attach to a pump that fills them with air to gently squeeze your legs every few minutes.   This helps return the blood in your legs to your heart.   SCDs should only be taken off when walking or bathing.   SCDs may not be comfortable, but they can help save your life.               Wearing compression stockings - if your doctor orders them. These special snug fitting stockings gently squeeze your legs to help blood flow.       Walking. Walking helps move the blood in your legs.   If your doctor says it is ok, try walking the halls at least   5 times a day. Ask us to help you get up, so you don't fall.      Taking any blood thinning medicines your doctor orders.        Page 1 of 2     [facility]; 3/23   Ways you can help prevent blood clots at home       Wearing compression stockings - if your doctor orders them. ? Walking - to help move the blood in your legs.       Taking any blood thinning medicines your doctor orders.      Signs of a blood clot or PE      Tell your doctor or nurse know right away if you have of the problems listed below.    If you are at home, seek medical care right away. Call 911 for chest pain or problems breathing.               Signs of a blood clot (DVT) - such as pain,  swelling, redness or warmth in your arm or leg      Signs of a pulmonary embolism (PE) - such as chest     pain or feeling short of breath

## 2024-09-10 DIAGNOSIS — F41.9 ANXIETY: Primary | ICD-10-CM

## 2024-09-10 RX ORDER — ALPRAZOLAM 0.5 MG/1
0.5 TABLET ORAL 3 TIMES DAILY PRN
Qty: 18 TABLET | Refills: 0 | Status: SHIPPED | OUTPATIENT
Start: 2024-09-10 | End: 2024-09-16

## 2024-09-17 ENCOUNTER — HOSPITAL ENCOUNTER (OUTPATIENT)
Facility: HOSPITAL | Age: 39
Discharge: HOME | End: 2024-09-18
Attending: STUDENT IN AN ORGANIZED HEALTH CARE EDUCATION/TRAINING PROGRAM | Admitting: OBSTETRICS & GYNECOLOGY
Payer: COMMERCIAL

## 2024-09-17 ENCOUNTER — ANESTHESIA (OUTPATIENT)
Dept: OPERATING ROOM | Facility: HOSPITAL | Age: 39
End: 2024-09-17
Payer: COMMERCIAL

## 2024-09-17 ENCOUNTER — ANESTHESIA EVENT (OUTPATIENT)
Dept: OPERATING ROOM | Facility: HOSPITAL | Age: 39
End: 2024-09-17
Payer: COMMERCIAL

## 2024-09-17 DIAGNOSIS — N80.9 ENDOMETRIOSIS DETERMINED BY LAPAROSCOPY: Primary | ICD-10-CM

## 2024-09-17 PROBLEM — Z98.890 PONV (POSTOPERATIVE NAUSEA AND VOMITING): Status: ACTIVE | Noted: 2024-09-17

## 2024-09-17 PROBLEM — R11.2 PONV (POSTOPERATIVE NAUSEA AND VOMITING): Status: ACTIVE | Noted: 2024-09-17

## 2024-09-17 PROBLEM — Z90.710 S/P LAPAROSCOPIC HYSTERECTOMY: Status: ACTIVE | Noted: 2024-09-17

## 2024-09-17 LAB
ABO GROUP (TYPE) IN BLOOD: NORMAL
ANTIBODY SCREEN: NORMAL
PREGNANCY TEST URINE, POC: NEGATIVE
RH FACTOR (ANTIGEN D): NORMAL

## 2024-09-17 PROCEDURE — 2500000004 HC RX 250 GENERAL PHARMACY W/ HCPCS (ALT 636 FOR OP/ED): Performed by: ANESTHESIOLOGY

## 2024-09-17 PROCEDURE — 7100000011 HC EXTENDED STAY RECOVERY HOURLY - NURSING UNIT

## 2024-09-17 PROCEDURE — 86901 BLOOD TYPING SEROLOGIC RH(D): CPT | Performed by: STUDENT IN AN ORGANIZED HEALTH CARE EDUCATION/TRAINING PROGRAM

## 2024-09-17 PROCEDURE — 2500000004 HC RX 250 GENERAL PHARMACY W/ HCPCS (ALT 636 FOR OP/ED): Performed by: ANESTHESIOLOGIST ASSISTANT

## 2024-09-17 PROCEDURE — 2500000004 HC RX 250 GENERAL PHARMACY W/ HCPCS (ALT 636 FOR OP/ED): Performed by: STUDENT IN AN ORGANIZED HEALTH CARE EDUCATION/TRAINING PROGRAM

## 2024-09-17 PROCEDURE — A58571 PR LAPAROSCOPY W TOT HYSTERECTUTERUS <=250 GRAM  W TUBE/OVARY: Performed by: ANESTHESIOLOGIST ASSISTANT

## 2024-09-17 PROCEDURE — 7100000002 HC RECOVERY ROOM TIME - EACH INCREMENTAL 1 MINUTE: Performed by: STUDENT IN AN ORGANIZED HEALTH CARE EDUCATION/TRAINING PROGRAM

## 2024-09-17 PROCEDURE — 2500000005 HC RX 250 GENERAL PHARMACY W/O HCPCS: Performed by: STUDENT IN AN ORGANIZED HEALTH CARE EDUCATION/TRAINING PROGRAM

## 2024-09-17 PROCEDURE — 58662 LAPAROSCOPY EXCISE LESIONS: CPT | Performed by: STUDENT IN AN ORGANIZED HEALTH CARE EDUCATION/TRAINING PROGRAM

## 2024-09-17 PROCEDURE — 58571 TLH W/T/O 250 G OR LESS: CPT | Performed by: STUDENT IN AN ORGANIZED HEALTH CARE EDUCATION/TRAINING PROGRAM

## 2024-09-17 PROCEDURE — 2500000005 HC RX 250 GENERAL PHARMACY W/O HCPCS: Performed by: ANESTHESIOLOGIST ASSISTANT

## 2024-09-17 PROCEDURE — 2500000001 HC RX 250 WO HCPCS SELF ADMINISTERED DRUGS (ALT 637 FOR MEDICARE OP): Performed by: STUDENT IN AN ORGANIZED HEALTH CARE EDUCATION/TRAINING PROGRAM

## 2024-09-17 PROCEDURE — 2780000003 HC OR 278 NO HCPCS: Performed by: STUDENT IN AN ORGANIZED HEALTH CARE EDUCATION/TRAINING PROGRAM

## 2024-09-17 PROCEDURE — 88305 TISSUE EXAM BY PATHOLOGIST: CPT | Mod: TC,SUR | Performed by: STUDENT IN AN ORGANIZED HEALTH CARE EDUCATION/TRAINING PROGRAM

## 2024-09-17 PROCEDURE — A58571 PR LAPAROSCOPY W TOT HYSTERECTUTERUS <=250 GRAM  W TUBE/OVARY: Performed by: ANESTHESIOLOGY

## 2024-09-17 PROCEDURE — 81025 URINE PREGNANCY TEST: CPT | Performed by: STUDENT IN AN ORGANIZED HEALTH CARE EDUCATION/TRAINING PROGRAM

## 2024-09-17 PROCEDURE — 3600000017 HC OR TIME - EACH INCREMENTAL 1 MINUTE - PROCEDURE LEVEL SIX: Performed by: STUDENT IN AN ORGANIZED HEALTH CARE EDUCATION/TRAINING PROGRAM

## 2024-09-17 PROCEDURE — 7100000001 HC RECOVERY ROOM TIME - INITIAL BASE CHARGE: Performed by: STUDENT IN AN ORGANIZED HEALTH CARE EDUCATION/TRAINING PROGRAM

## 2024-09-17 PROCEDURE — 3700000002 HC GENERAL ANESTHESIA TIME - EACH INCREMENTAL 1 MINUTE: Performed by: STUDENT IN AN ORGANIZED HEALTH CARE EDUCATION/TRAINING PROGRAM

## 2024-09-17 PROCEDURE — 36415 COLL VENOUS BLD VENIPUNCTURE: CPT | Performed by: STUDENT IN AN ORGANIZED HEALTH CARE EDUCATION/TRAINING PROGRAM

## 2024-09-17 PROCEDURE — 2720000007 HC OR 272 NO HCPCS: Performed by: STUDENT IN AN ORGANIZED HEALTH CARE EDUCATION/TRAINING PROGRAM

## 2024-09-17 PROCEDURE — 3700000001 HC GENERAL ANESTHESIA TIME - INITIAL BASE CHARGE: Performed by: STUDENT IN AN ORGANIZED HEALTH CARE EDUCATION/TRAINING PROGRAM

## 2024-09-17 PROCEDURE — 3600000018 HC OR TIME - INITIAL BASE CHARGE - PROCEDURE LEVEL SIX: Performed by: STUDENT IN AN ORGANIZED HEALTH CARE EDUCATION/TRAINING PROGRAM

## 2024-09-17 PROCEDURE — 2500000001 HC RX 250 WO HCPCS SELF ADMINISTERED DRUGS (ALT 637 FOR MEDICARE OP)

## 2024-09-17 RX ORDER — HYDROMORPHONE HYDROCHLORIDE 1 MG/ML
INJECTION, SOLUTION INTRAMUSCULAR; INTRAVENOUS; SUBCUTANEOUS AS NEEDED
Status: DISCONTINUED | OUTPATIENT
Start: 2024-09-17 | End: 2024-09-17

## 2024-09-17 RX ORDER — APREPITANT 40 MG/1
40 CAPSULE ORAL ONCE
Status: DISCONTINUED | OUTPATIENT
Start: 2024-09-17 | End: 2024-09-17

## 2024-09-17 RX ORDER — MIDAZOLAM HYDROCHLORIDE 1 MG/ML
INJECTION INTRAMUSCULAR; INTRAVENOUS AS NEEDED
Status: DISCONTINUED | OUTPATIENT
Start: 2024-09-17 | End: 2024-09-17

## 2024-09-17 RX ORDER — BUPIVACAINE HYDROCHLORIDE 5 MG/ML
INJECTION, SOLUTION PERINEURAL AS NEEDED
Status: DISCONTINUED | OUTPATIENT
Start: 2024-09-17 | End: 2024-09-17 | Stop reason: HOSPADM

## 2024-09-17 RX ORDER — METHOCARBAMOL 100 MG/ML
1000 INJECTION, SOLUTION INTRAMUSCULAR; INTRAVENOUS ONCE
Status: DISCONTINUED | OUTPATIENT
Start: 2024-09-17 | End: 2024-09-17

## 2024-09-17 RX ORDER — GABAPENTIN 300 MG/1
600 CAPSULE ORAL ONCE
Status: COMPLETED | OUTPATIENT
Start: 2024-09-17 | End: 2024-09-17

## 2024-09-17 RX ORDER — POLYETHYLENE GLYCOL 3350 17 G/17G
17 POWDER, FOR SOLUTION ORAL DAILY
Status: DISCONTINUED | OUTPATIENT
Start: 2024-09-18 | End: 2024-09-18 | Stop reason: HOSPADM

## 2024-09-17 RX ORDER — MEPERIDINE HYDROCHLORIDE 25 MG/ML
12.5 INJECTION INTRAMUSCULAR; INTRAVENOUS; SUBCUTANEOUS EVERY 10 MIN PRN
Status: DISCONTINUED | OUTPATIENT
Start: 2024-09-17 | End: 2024-09-17

## 2024-09-17 RX ORDER — ACETAMINOPHEN 325 MG/1
975 TABLET ORAL ONCE
Status: COMPLETED | OUTPATIENT
Start: 2024-09-17 | End: 2024-09-17

## 2024-09-17 RX ORDER — ONDANSETRON 4 MG/1
4 TABLET, FILM COATED ORAL EVERY 12 HOURS PRN
Qty: 14 TABLET | Refills: 0 | Status: SHIPPED | OUTPATIENT
Start: 2024-09-17

## 2024-09-17 RX ORDER — LIDOCAINE HYDROCHLORIDE 10 MG/ML
0.1 INJECTION, SOLUTION EPIDURAL; INFILTRATION; INTRACAUDAL; PERINEURAL ONCE
Status: DISCONTINUED | OUTPATIENT
Start: 2024-09-17 | End: 2024-09-17

## 2024-09-17 RX ORDER — PHENYLEPHRINE HCL IN 0.9% NACL 0.4MG/10ML
SYRINGE (ML) INTRAVENOUS AS NEEDED
Status: DISCONTINUED | OUTPATIENT
Start: 2024-09-17 | End: 2024-09-17

## 2024-09-17 RX ORDER — FENTANYL CITRATE 50 UG/ML
INJECTION, SOLUTION INTRAMUSCULAR; INTRAVENOUS AS NEEDED
Status: DISCONTINUED | OUTPATIENT
Start: 2024-09-17 | End: 2024-09-17

## 2024-09-17 RX ORDER — SODIUM CHLORIDE, SODIUM LACTATE, POTASSIUM CHLORIDE, CALCIUM CHLORIDE 600; 310; 30; 20 MG/100ML; MG/100ML; MG/100ML; MG/100ML
INJECTION, SOLUTION INTRAVENOUS CONTINUOUS PRN
Status: DISCONTINUED | OUTPATIENT
Start: 2024-09-17 | End: 2024-09-17

## 2024-09-17 RX ORDER — OXYCODONE HYDROCHLORIDE 5 MG/1
5 TABLET ORAL EVERY 6 HOURS PRN
Qty: 15 TABLET | Refills: 0 | Status: SHIPPED | OUTPATIENT
Start: 2024-09-17

## 2024-09-17 RX ORDER — HYDROMORPHONE HYDROCHLORIDE 1 MG/ML
0.2 INJECTION, SOLUTION INTRAMUSCULAR; INTRAVENOUS; SUBCUTANEOUS EVERY 5 MIN PRN
Status: DISCONTINUED | OUTPATIENT
Start: 2024-09-17 | End: 2024-09-17

## 2024-09-17 RX ORDER — PROPOFOL 10 MG/ML
INJECTION, EMULSION INTRAVENOUS AS NEEDED
Status: DISCONTINUED | OUTPATIENT
Start: 2024-09-17 | End: 2024-09-17

## 2024-09-17 RX ORDER — OXYCODONE HYDROCHLORIDE 5 MG/1
5 TABLET ORAL EVERY 4 HOURS PRN
Status: DISCONTINUED | OUTPATIENT
Start: 2024-09-17 | End: 2024-09-17

## 2024-09-17 RX ORDER — DROPERIDOL 2.5 MG/ML
0.62 INJECTION, SOLUTION INTRAMUSCULAR; INTRAVENOUS ONCE AS NEEDED
Status: COMPLETED | OUTPATIENT
Start: 2024-09-17 | End: 2024-09-17

## 2024-09-17 RX ORDER — ONDANSETRON HYDROCHLORIDE 2 MG/ML
4 INJECTION, SOLUTION INTRAVENOUS EVERY 6 HOURS PRN
Status: DISCONTINUED | OUTPATIENT
Start: 2024-09-17 | End: 2024-09-18 | Stop reason: HOSPADM

## 2024-09-17 RX ORDER — ONDANSETRON HYDROCHLORIDE 2 MG/ML
INJECTION, SOLUTION INTRAVENOUS AS NEEDED
Status: DISCONTINUED | OUTPATIENT
Start: 2024-09-17 | End: 2024-09-17

## 2024-09-17 RX ORDER — ROCURONIUM BROMIDE 10 MG/ML
INJECTION, SOLUTION INTRAVENOUS AS NEEDED
Status: DISCONTINUED | OUTPATIENT
Start: 2024-09-17 | End: 2024-09-17

## 2024-09-17 RX ORDER — SODIUM CHLORIDE, SODIUM LACTATE, POTASSIUM CHLORIDE, CALCIUM CHLORIDE 600; 310; 30; 20 MG/100ML; MG/100ML; MG/100ML; MG/100ML
100 INJECTION, SOLUTION INTRAVENOUS CONTINUOUS
Status: DISCONTINUED | OUTPATIENT
Start: 2024-09-17 | End: 2024-09-17 | Stop reason: HOSPADM

## 2024-09-17 RX ORDER — POLYETHYLENE GLYCOL 3350 17 G/17G
17 POWDER, FOR SOLUTION ORAL DAILY
Qty: 30 EACH | Refills: 0 | Status: SHIPPED | OUTPATIENT
Start: 2024-09-17

## 2024-09-17 RX ORDER — IBUPROFEN 600 MG/1
600 TABLET ORAL EVERY 6 HOURS
Status: DISCONTINUED | OUTPATIENT
Start: 2024-09-17 | End: 2024-09-18 | Stop reason: HOSPADM

## 2024-09-17 RX ORDER — CELECOXIB 200 MG/1
400 CAPSULE ORAL ONCE
Status: COMPLETED | OUTPATIENT
Start: 2024-09-17 | End: 2024-09-17

## 2024-09-17 RX ORDER — LABETALOL HYDROCHLORIDE 5 MG/ML
5 INJECTION, SOLUTION INTRAVENOUS ONCE AS NEEDED
Status: DISCONTINUED | OUTPATIENT
Start: 2024-09-17 | End: 2024-09-17

## 2024-09-17 RX ORDER — SIMETHICONE 80 MG
80 TABLET,CHEWABLE ORAL 4 TIMES DAILY PRN
Status: DISCONTINUED | OUTPATIENT
Start: 2024-09-17 | End: 2024-09-18 | Stop reason: HOSPADM

## 2024-09-17 RX ORDER — CEFAZOLIN 1 G/1
INJECTION, POWDER, FOR SOLUTION INTRAVENOUS AS NEEDED
Status: DISCONTINUED | OUTPATIENT
Start: 2024-09-17 | End: 2024-09-17

## 2024-09-17 RX ORDER — NALOXONE HYDROCHLORIDE 0.4 MG/ML
0.1 INJECTION, SOLUTION INTRAMUSCULAR; INTRAVENOUS; SUBCUTANEOUS EVERY 5 MIN PRN
Status: DISCONTINUED | OUTPATIENT
Start: 2024-09-17 | End: 2024-09-18 | Stop reason: HOSPADM

## 2024-09-17 RX ORDER — OXYCODONE HYDROCHLORIDE 5 MG/1
5 TABLET ORAL EVERY 4 HOURS PRN
Status: DISCONTINUED | OUTPATIENT
Start: 2024-09-17 | End: 2024-09-18 | Stop reason: HOSPADM

## 2024-09-17 RX ORDER — MIDAZOLAM HYDROCHLORIDE 1 MG/ML
1 INJECTION INTRAMUSCULAR; INTRAVENOUS ONCE AS NEEDED
Status: DISCONTINUED | OUTPATIENT
Start: 2024-09-17 | End: 2024-09-17

## 2024-09-17 RX ORDER — KETOROLAC TROMETHAMINE 30 MG/ML
INJECTION, SOLUTION INTRAMUSCULAR; INTRAVENOUS AS NEEDED
Status: DISCONTINUED | OUTPATIENT
Start: 2024-09-17 | End: 2024-09-17

## 2024-09-17 RX ORDER — GLYCOPYRROLATE 0.2 MG/ML
INJECTION INTRAMUSCULAR; INTRAVENOUS AS NEEDED
Status: DISCONTINUED | OUTPATIENT
Start: 2024-09-17 | End: 2024-09-17

## 2024-09-17 RX ORDER — IBUPROFEN 800 MG/1
800 TABLET ORAL EVERY 6 HOURS PRN
Qty: 30 TABLET | Refills: 0 | Status: SHIPPED | OUTPATIENT
Start: 2024-09-17

## 2024-09-17 RX ORDER — HYDROMORPHONE HYDROCHLORIDE 1 MG/ML
0.5 INJECTION, SOLUTION INTRAMUSCULAR; INTRAVENOUS; SUBCUTANEOUS EVERY 5 MIN PRN
Status: DISCONTINUED | OUTPATIENT
Start: 2024-09-17 | End: 2024-09-17

## 2024-09-17 RX ORDER — CITALOPRAM 10 MG/1
10 TABLET ORAL DAILY
Status: DISCONTINUED | OUTPATIENT
Start: 2024-09-18 | End: 2024-09-18 | Stop reason: HOSPADM

## 2024-09-17 RX ORDER — SCOLOPAMINE TRANSDERMAL SYSTEM 1 MG/1
1 PATCH, EXTENDED RELEASE TRANSDERMAL
Status: DISCONTINUED | OUTPATIENT
Start: 2024-09-17 | End: 2024-09-18 | Stop reason: HOSPADM

## 2024-09-17 RX ORDER — PHENAZOPYRIDINE HYDROCHLORIDE 100 MG/1
200 TABLET, FILM COATED ORAL ONCE
Status: COMPLETED | OUTPATIENT
Start: 2024-09-17 | End: 2024-09-17

## 2024-09-17 RX ORDER — METRONIDAZOLE 500 MG/100ML
INJECTION, SOLUTION INTRAVENOUS AS NEEDED
Status: DISCONTINUED | OUTPATIENT
Start: 2024-09-17 | End: 2024-09-17

## 2024-09-17 RX ORDER — ACETAMINOPHEN 500 MG
1000 TABLET ORAL EVERY 6 HOURS PRN
Qty: 30 TABLET | Refills: 0 | Status: SHIPPED | OUTPATIENT
Start: 2024-09-17

## 2024-09-17 RX ORDER — ALBUTEROL SULFATE 0.83 MG/ML
2.5 SOLUTION RESPIRATORY (INHALATION) ONCE AS NEEDED
Status: DISCONTINUED | OUTPATIENT
Start: 2024-09-17 | End: 2024-09-17

## 2024-09-17 RX ORDER — ACETAMINOPHEN 325 MG/1
975 TABLET ORAL EVERY 6 HOURS
Status: DISCONTINUED | OUTPATIENT
Start: 2024-09-17 | End: 2024-09-18 | Stop reason: HOSPADM

## 2024-09-17 RX ORDER — SODIUM CHLORIDE 0.9 G/100ML
IRRIGANT IRRIGATION AS NEEDED
Status: DISCONTINUED | OUTPATIENT
Start: 2024-09-17 | End: 2024-09-17 | Stop reason: HOSPADM

## 2024-09-17 SDOH — ECONOMIC STABILITY: INCOME INSECURITY: HOW HARD IS IT FOR YOU TO PAY FOR THE VERY BASICS LIKE FOOD, HOUSING, MEDICAL CARE, AND HEATING?: NOT HARD AT ALL

## 2024-09-17 SDOH — SOCIAL STABILITY: SOCIAL INSECURITY: ARE THERE ANY APPARENT SIGNS OF INJURIES/BEHAVIORS THAT COULD BE RELATED TO ABUSE/NEGLECT?: NO

## 2024-09-17 SDOH — ECONOMIC STABILITY: INCOME INSECURITY: IN THE LAST 12 MONTHS, WAS THERE A TIME WHEN YOU WERE NOT ABLE TO PAY THE MORTGAGE OR RENT ON TIME?: NO

## 2024-09-17 SDOH — SOCIAL STABILITY: SOCIAL INSECURITY: DO YOU FEEL ANYONE HAS EXPLOITED OR TAKEN ADVANTAGE OF YOU FINANCIALLY OR OF YOUR PERSONAL PROPERTY?: NO

## 2024-09-17 SDOH — ECONOMIC STABILITY: HOUSING INSECURITY: AT ANY TIME IN THE PAST 12 MONTHS, WERE YOU HOMELESS OR LIVING IN A SHELTER (INCLUDING NOW)?: NO

## 2024-09-17 SDOH — SOCIAL STABILITY: SOCIAL INSECURITY: DOES ANYONE TRY TO KEEP YOU FROM HAVING/CONTACTING OTHER FRIENDS OR DOING THINGS OUTSIDE YOUR HOME?: NO

## 2024-09-17 SDOH — HEALTH STABILITY: MENTAL HEALTH: CURRENT SMOKER: 0

## 2024-09-17 SDOH — SOCIAL STABILITY: SOCIAL INSECURITY: HAS ANYONE EVER THREATENED TO HURT YOUR FAMILY OR YOUR PETS?: NO

## 2024-09-17 SDOH — ECONOMIC STABILITY: HOUSING INSECURITY: IN THE PAST 12 MONTHS, HOW MANY TIMES HAVE YOU MOVED WHERE YOU WERE LIVING?: 0

## 2024-09-17 SDOH — SOCIAL STABILITY: SOCIAL INSECURITY: ARE YOU OR HAVE YOU BEEN THREATENED OR ABUSED PHYSICALLY, EMOTIONALLY, OR SEXUALLY BY ANYONE?: NO

## 2024-09-17 SDOH — SOCIAL STABILITY: SOCIAL INSECURITY: WERE YOU ABLE TO COMPLETE ALL THE BEHAVIORAL HEALTH SCREENINGS?: YES

## 2024-09-17 SDOH — SOCIAL STABILITY: SOCIAL INSECURITY: DO YOU FEEL UNSAFE GOING BACK TO THE PLACE WHERE YOU ARE LIVING?: NO

## 2024-09-17 SDOH — ECONOMIC STABILITY: TRANSPORTATION INSECURITY
IN THE PAST 12 MONTHS, HAS LACK OF TRANSPORTATION KEPT YOU FROM MEETINGS, WORK, OR FROM GETTING THINGS NEEDED FOR DAILY LIVING?: NO

## 2024-09-17 SDOH — SOCIAL STABILITY: SOCIAL INSECURITY: HAVE YOU HAD THOUGHTS OF HARMING ANYONE ELSE?: NO

## 2024-09-17 SDOH — SOCIAL STABILITY: SOCIAL INSECURITY: ABUSE: ADULT

## 2024-09-17 SDOH — ECONOMIC STABILITY: TRANSPORTATION INSECURITY
IN THE PAST 12 MONTHS, HAS THE LACK OF TRANSPORTATION KEPT YOU FROM MEDICAL APPOINTMENTS OR FROM GETTING MEDICATIONS?: NO

## 2024-09-17 ASSESSMENT — PAIN SCALES - GENERAL
PAINLEVEL_OUTOF10: 3
PAINLEVEL_OUTOF10: 5 - MODERATE PAIN
PAINLEVEL_OUTOF10: 0 - NO PAIN
PAINLEVEL_OUTOF10: 5 - MODERATE PAIN
PAINLEVEL_OUTOF10: 3
PAINLEVEL_OUTOF10: 0 - NO PAIN
PAINLEVEL_OUTOF10: 3
PAINLEVEL_OUTOF10: 3
PAINLEVEL_OUTOF10: 5 - MODERATE PAIN
PAINLEVEL_OUTOF10: 7
PAINLEVEL_OUTOF10: 3
PAINLEVEL_OUTOF10: 0 - NO PAIN
PAINLEVEL_OUTOF10: 5 - MODERATE PAIN
PAINLEVEL_OUTOF10: 3
PAINLEVEL_OUTOF10: 3
PAINLEVEL_OUTOF10: 5 - MODERATE PAIN

## 2024-09-17 ASSESSMENT — PATIENT HEALTH QUESTIONNAIRE - PHQ9
SUM OF ALL RESPONSES TO PHQ9 QUESTIONS 1 & 2: 0
1. LITTLE INTEREST OR PLEASURE IN DOING THINGS: NOT AT ALL
2. FEELING DOWN, DEPRESSED OR HOPELESS: NOT AT ALL

## 2024-09-17 ASSESSMENT — LIFESTYLE VARIABLES
AUDIT-C TOTAL SCORE: 0
HOW OFTEN DO YOU HAVE 6 OR MORE DRINKS ON ONE OCCASION: NEVER
AUDIT-C TOTAL SCORE: 0
SKIP TO QUESTIONS 9-10: 1
HOW MANY STANDARD DRINKS CONTAINING ALCOHOL DO YOU HAVE ON A TYPICAL DAY: PATIENT DOES NOT DRINK
HOW OFTEN DO YOU HAVE A DRINK CONTAINING ALCOHOL: NEVER

## 2024-09-17 ASSESSMENT — COGNITIVE AND FUNCTIONAL STATUS - GENERAL
PATIENT BASELINE BEDBOUND: NO
DAILY ACTIVITIY SCORE: 24
MOBILITY SCORE: 24

## 2024-09-17 ASSESSMENT — COLUMBIA-SUICIDE SEVERITY RATING SCALE - C-SSRS
6. HAVE YOU EVER DONE ANYTHING, STARTED TO DO ANYTHING, OR PREPARED TO DO ANYTHING TO END YOUR LIFE?: NO
1. IN THE PAST MONTH, HAVE YOU WISHED YOU WERE DEAD OR WISHED YOU COULD GO TO SLEEP AND NOT WAKE UP?: NO
2. HAVE YOU ACTUALLY HAD ANY THOUGHTS OF KILLING YOURSELF?: NO

## 2024-09-17 ASSESSMENT — ACTIVITIES OF DAILY LIVING (ADL)
BATHING: INDEPENDENT
DRESSING YOURSELF: INDEPENDENT
JUDGMENT_ADEQUATE_SAFELY_COMPLETE_DAILY_ACTIVITIES: YES
TOILETING: INDEPENDENT
FEEDING YOURSELF: INDEPENDENT
PATIENT'S MEMORY ADEQUATE TO SAFELY COMPLETE DAILY ACTIVITIES?: YES
WALKS IN HOME: INDEPENDENT
ASSISTIVE_DEVICE: EYEGLASSES
HEARING - RIGHT EAR: FUNCTIONAL
HEARING - LEFT EAR: FUNCTIONAL
ADEQUATE_TO_COMPLETE_ADL: YES
GROOMING: INDEPENDENT

## 2024-09-17 ASSESSMENT — PAIN - FUNCTIONAL ASSESSMENT
PAIN_FUNCTIONAL_ASSESSMENT: 0-10

## 2024-09-17 ASSESSMENT — PAIN DESCRIPTION - LOCATION: LOCATION: ABDOMEN

## 2024-09-17 NOTE — ANESTHESIA PROCEDURE NOTES
Airway  Date/Time: 9/17/2024 12:23 PM  Urgency: elective    Airway not difficult    Staffing  Performed: RAÚL   Authorized by: Vijay Bro MD    Performed by: RAÚL Hernandez  Patient location during procedure: OR    Indications and Patient Condition  Indications for airway management: anesthesia  Spontaneous Ventilation: absent  Sedation level: deep  Preoxygenated: yes  Patient position: sniffing  Mask difficulty assessment: 1 - vent by mask  Planned trial extubation    Final Airway Details  Final airway type: endotracheal airway      Successful airway: ETT  Cuffed: yes   Successful intubation technique: direct laryngoscopy  Facilitating devices/methods: intubating stylet  Endotracheal tube insertion site: oral  Blade: Shabbir  Blade size: #3  ETT size (mm): 7.0  Cormack-Lehane Classification: grade IIa - partial view of glottis  Placement verified by: chest auscultation and capnometry   Measured from: teeth  ETT to teeth (cm): 21  Number of attempts at approach: 1

## 2024-09-17 NOTE — H&P
"History Of Present Illness  Sharon Lino is a 39 y.o. female presenting for Robotic assisted total laparoscopic hysterectomy, bilateral salpingectomy, right oophorectomy, excision of endometriosis, and cystoscopy for BRCA 2 +and history of endometriosis.     GYN Hx  Gynecologic history:  Contraception/menstrual regulation:  partner vasectomy  Desires Childbearing: denies  Last pap: NILM neg HPV   Grandmother with breast and pancreatic cancer. Dad + BRCA.   OBHx:  x 2  PMH: BRCA 2, endometriosis, rheumatoid arthritis, anxiety   PSH: Laparoscopic appendectomy, Double mastectomy with reconstruction, D&C, Tonsillectomy    Social History  She reports that she quit smoking about 9 years ago. Her smoking use included cigarettes. She has never used smokeless tobacco. She reports current alcohol use. She reports that she does not use drugs.    Family History  Family History   Problem Relation Name Age of Onset    Hypertension Mother          Allergies  Patient has no known allergies.    Review of Systems   All other systems reviewed and are negative.       Physical Exam  Constitutional:       General: She is not in acute distress.  Cardiovascular:      Comments: Hemodynamically stable  Pulmonary:      Effort: Pulmonary effort is normal.   Genitourinary:     Comments: Deferred until OR  Skin:     General: Skin is warm.   Neurological:      General: No focal deficit present.      Mental Status: She is alert.   Psychiatric:         Thought Content: Thought content normal.       Last Recorded Vitals  Blood pressure 117/73, pulse 90, temperature 36.9 °C (98.4 °F), temperature source Temporal, resp. rate 16, height 1.702 m (5' 7\"), weight 86 kg (189 lb 9.5 oz), SpO2 98%.    Relevant Results  Medications    Current Facility-Administered Medications:     acetaminophen (Tylenol) tablet 975 mg, 975 mg, oral, Once, Dina Wong MD    celecoxib (CeleBREX) capsule 400 mg, 400 mg, oral, Once, Dina Wong MD    gabapentin " (Neurontin) tablet 600 mg, 600 mg, oral, Once, Dina Wong MD    phenazopyridine (Pyridium) tablet 200 mg, 200 mg, oral, Once, Dina Wong MD    Imaging   Results for orders placed during the hospital encounter of 06/25/24    US PELVIS TRANSABDOMINAL WITH TRANSVAGINAL    Narrative  Interpreted By:  Lucía Dent,  STUDY:  US PELVIS TRANSABDOMINAL WITH TRANSVAGINAL;  6/25/2024 7:54 am    INDICATION:  Signs/Symptoms:abnormal ultrasound.  LMP 06/17/2024    COMPARISON:  05/15/2024    ACCESSION NUMBER(S):  [id number]    ORDERING CLINICIAN:  HAYDEN DAVIES    TECHNIQUE:  Multiple multiplanar static gray scale, color and spectral waveform  sonographic images of the pelvis were obtained. Transabdominal and  endovaginal ultrasound was performed.    FINDINGS:  UTERUS:  The uterus is  normal in size measuring 6.8 x 5.9 x 4.6 cm in  longitudinal, transverse and AP dimensions respectively. The uterus  is retroflexed. There is a 3.6 x 3.2 x 2.5 cm fundal fibroid    ENDOMETRIUM:  The endometrial canal is  normal in thickness measuring 0.7 cm.    RIGHT ADNEXA:  The right ovary is large in size measuring 7.7 x 5.0 x 4.0 cm.  There is a 4.3 x 4.1 x 3.6 cm cystic area with diffuse low-level  echoes. There are no septations. There is no solid component. This  could represent a hemorrhagic cyst or endometrioma. On 05/16/2024,  this measured 5.0 x 4.2 x 3.8 cm There is an adjacent abutting 2.6 x  2.4 cm cystic area with low-level echoes. There are no septations.  There is no solid component. This could represent a hemorrhagic cyst  or endometrioma. On 05/16/2024, this measured 2.5 x 2.5 x 2.4 cm  Color Doppler spectral wave imaging shows flow.    LEFT ADNEXA:  The left ovary measures 4.1 x 3.2 x 2.5 cm.  There is a 1.4 cm hypoechoic area in the left ovary which could  represent a follicle with low-level echoes and hemorrhage or  endometrioma. On 05/15/2024, this measured 2.1 x 2.0 x 1.8 cm  Color Doppler spectral wave imaging  shows flow.    CUL DE SAC:  No gross free fluid is seen in the pelvic cul-de-sac.    Impression  1. 3.6 cm fundal fibroid.  2. 2 cystic areas in the right ovary with diffuse low-level echoes,  likely hemorrhagic cysts or endometriomas. One of these is smaller in  one of these is unchanged.  3. 1.4 cm cystic area with low-level echoes in the left ovary, likely  a hemorrhagic follicle or small endometrioma. This is smaller.    MACRO:  None    Signed by: Lucía Dent 6/26/2024 9:47 AM  Dictation workstation:   [id number]     Assessment/Plan   Assessment & Plan  Endometriosis determined by laparoscopy  Sharon Lino is a 39 y.o. female presenting for Robotic assisted total laparoscopic hysterectomy, bilateral salpingectomy, right oophorectomy, excision of endometriosis, and cystoscopy for BRCA 2 +and history of endometriosis.     Plan  - Preop meds ordered, to be administered prior to procedure   - Anticipate same day discharge      To be seen with Dr. Marvin Cuenca MD, PGY-1  Gynecology, pager [phone]

## 2024-09-17 NOTE — H&P
History Of Present Illness  Sharon Lino is a 39 y.o. female presenting for Robotic assisted total laparoscopic hysterectomy, bilateral salpingectomy, right oophorectomy, excision of endometriosis, and cystoscopy for BRCA 2 +and history of endometriosis.     GYN Hx  Gynecologic history:  Contraception/menstrual regulation:  partner vasectomy  Desires Childbearing: denies  Last pap: NILM neg HPV   Grandmother with breast and pancreatic cancer. Dad + BRCA.   OBHx:  x 2  PMH: BRCA 2, endometriosis, rheumatoid arthritis, anxiety   PSH: Laparoscopic appendectomy, Double mastectomy with reconstruction, D&C, Tonsillectomy    Social History  She reports that she quit smoking about 9 years ago. Her smoking use included cigarettes. She has never used smokeless tobacco. She reports current alcohol use. She reports that she does not use drugs.    Family History  Family History   Problem Relation Name Age of Onset    Hypertension Mother          Allergies  Patient has no known allergies.    Review of Systems   All other systems reviewed and are negative.       Physical Exam  Constitutional:       General: She is not in acute distress.  Cardiovascular:      Comments: Hemodynamically stable  Pulmonary:      Effort: Pulmonary effort is normal.   Genitourinary:     Comments: Deferred until OR  Skin:     General: Skin is warm.   Neurological:      General: No focal deficit present.      Mental Status: She is alert.   Psychiatric:         Thought Content: Thought content normal.       Last Recorded Vitals  There were no vitals taken for this visit.    Relevant Results  Medications  No current facility-administered medications for this encounter.    Current Outpatient Medications:     ALPRAZolam (Xanax) 0.5 mg tablet, Take 1 tablet (0.5 mg) by mouth 3 times a day as needed for anxiety for up to 6 days., Disp: 18 tablet, Rfl: 0    citalopram (CeleXA) 10 mg tablet, Take 1 tablet (10 mg) by mouth once daily., Disp: 90 tablet,  Rfl: 3    meloxicam (Mobic) 15 mg tablet, Take 1 tablet (15 mg) by mouth once daily as needed for moderate pain (4 - 6)., Disp: 90 tablet, Rfl: 3    Mounjaro 2.5 mg/0.5 mL pen injector, INJECT 2.5MG SUBCUTANEOUSLY ONCE EVERY WEEK, Disp: , Rfl:     omeprazole (PriLOSEC) 40 mg DR capsule, Take 1 capsule (40 mg) by mouth 2 times a day before meals. Do not crush or chew., Disp: 60 capsule, Rfl: 1    omeprazole OTC (PriLOSEC OTC) 20 mg EC tablet, Take 1 tablet (20 mg) by mouth once daily in the morning. Take before meals. Do not crush, chew, or split., Disp: , Rfl:     Imaging   Results for orders placed during the hospital encounter of 06/25/24    US PELVIS TRANSABDOMINAL WITH TRANSVAGINAL    Narrative  Interpreted By:  Lucía Dent,  STUDY:  US PELVIS TRANSABDOMINAL WITH TRANSVAGINAL;  6/25/2024 7:54 am    INDICATION:  Signs/Symptoms:abnormal ultrasound.  LMP 06/17/2024    COMPARISON:  05/15/2024    ACCESSION NUMBER(S):  [id number]    ORDERING CLINICIAN:  HAYDEN DAVIES    TECHNIQUE:  Multiple multiplanar static gray scale, color and spectral waveform  sonographic images of the pelvis were obtained. Transabdominal and  endovaginal ultrasound was performed.    FINDINGS:  UTERUS:  The uterus is  normal in size measuring 6.8 x 5.9 x 4.6 cm in  longitudinal, transverse and AP dimensions respectively. The uterus  is retroflexed. There is a 3.6 x 3.2 x 2.5 cm fundal fibroid    ENDOMETRIUM:  The endometrial canal is  normal in thickness measuring 0.7 cm.    RIGHT ADNEXA:  The right ovary is large in size measuring 7.7 x 5.0 x 4.0 cm.  There is a 4.3 x 4.1 x 3.6 cm cystic area with diffuse low-level  echoes. There are no septations. There is no solid component. This  could represent a hemorrhagic cyst or endometrioma. On 05/16/2024,  this measured 5.0 x 4.2 x 3.8 cm There is an adjacent abutting 2.6 x  2.4 cm cystic area with low-level echoes. There are no septations.  There is no solid component. This could represent a  hemorrhagic cyst  or endometrioma. On 05/16/2024, this measured 2.5 x 2.5 x 2.4 cm  Color Doppler spectral wave imaging shows flow.    LEFT ADNEXA:  The left ovary measures 4.1 x 3.2 x 2.5 cm.  There is a 1.4 cm hypoechoic area in the left ovary which could  represent a follicle with low-level echoes and hemorrhage or  endometrioma. On 05/15/2024, this measured 2.1 x 2.0 x 1.8 cm  Color Doppler spectral wave imaging shows flow.    CUL DE SAC:  No gross free fluid is seen in the pelvic cul-de-sac.    Impression  1. 3.6 cm fundal fibroid.  2. 2 cystic areas in the right ovary with diffuse low-level echoes,  likely hemorrhagic cysts or endometriomas. One of these is smaller in  one of these is unchanged.  3. 1.4 cm cystic area with low-level echoes in the left ovary, likely  a hemorrhagic follicle or small endometrioma. This is smaller.    MACRO:  None    Signed by: Lucía Dent 6/26/2024 9:47 AM  Dictation workstation:   [id number]     Assessment/Plan   Assessment & Plan  Endometriosis determined by laparoscopy  Sharon Lino is a 39 y.o. female presenting for Robotic assisted total laparoscopic hysterectomy, bilateral salpingectomy, right oophorectomy, excision of endometriosis, and cystoscopy for BRCA 2 +and history of endometriosis.     Plan  - Preop meds ordered, to be administered prior to procedure   - Anticipate same day discharge      To be seen with Dr. Marvin Shipman MD, PGY-3  Gynecology, pager [phone]

## 2024-09-17 NOTE — ANESTHESIA PROCEDURE NOTES
Peripheral IV  Date/Time: 9/17/2024 12:30 PM      Placement  Needle size: 20 G  Laterality: left  Location: antecubital  Local anesthetic: none  Site prep: alcohol  Technique: anatomical landmarks  Attempts: 1

## 2024-09-17 NOTE — DISCHARGE INSTRUCTIONS
Laparoscopic Hysterectomy Discharge Instructions  If you have any questions about your care, please contact us at [phone].    Medications and Pain Management  Common areas of pain after laparoscopic hysterectomy include the incision pain, pain in between your shoulder blades, the pelvis and lower back. The gas that was used to distend your abdomen for the surgery is absorbed slowly into your blood stream over the first 3-4 days after surgery. It is not passed intestinally, although, because your abdomen is distended, it may feel similar to intestinal gas. Staying active and walking is the best way to promote the absorption of this gas.  Immediately after surgery, nerve pain is the most intense, typically for the first 6 to 12 hours. As the body heals, it creates inflammation around the incisions sites adding pressure and creating soreness. After 5 days, the inflammation begins to recede and significant improvement in soreness is expected. Pulling on the incisions, especially if sudden, such as when you cough, will reactivate the nerve pain. Support your abdomen with a pillow during coughing or sneezing as this will be helpful to minimize pain. There are two types of pain pills typically used for post-operative pain management, narcotics such as tramadol and an anti-inflammatory such as Ibuprofen or Naprosyn.  Taking regular anti-inflammatory pills, such as 600mg of Ibuprofen every 6 hours for the first 5 days and then as needed is recommended. You can alternate ibuprofen with tylenol (975mg or 1000mg). The tylenol can be taken every 6 hours.  If you have problems using NSAIDs, be sure to discuss this with your doctor. The narcotic can be used on a schedule for the first 1 to 2 days but after that, only as you need it. Narcotics can cause constipation, nausea, sleepiness and headaches. You may begin your usual home medications as you were taking before unless directed by your doctor.    Incisional  care  Paper tape steri-strips are typically used for the abdominal incisions. The steri-strips will fall off on their own or can be You may shower and use a mild soap around the incisions and pat dry. Do not use a washcloth or scrub the incisions. Using peroxide or antiseptics is not recommended for routine care. Avoid hot and steamy showers as this may cause you to feel faint. No tub baths for six weeks following surgery. There may be discoloration or bruising around the incisions. This is normal and may take several weeks to resolve. Firmness or a nodular area under the skin near the incision may represent a collection of blood, this too will resolve on its own after a little time. If any incision develops tenderness, redness in the skin layer or has drainage please call the office.    Vaginal Discharge  You may have a mildly malodorous discharge and occasional spotting for up to 6 weeks. Do not put anything in the vagina like tampons or have sexual intercourse for 6 weeks after surgery. If you are having bleeding like a period, that is abnormal and you should contact your doctor.    GI Function, Nausea and Constipation  Nausea can occasionally be an issue in the first few days after surgery. It is usually caused as a side effect from the anesthesia and pain medicine, particularly narcotics. Taking the pain pills with food is a food way to proactively minimize this. Throwing up, especially after the first day, is not expected and if this happens, you should call your doctor. Feeling gassy and constipation can be a problem for the first week after surgery. Limiting the use of narcotics may be helpful. Stool softeners twice a day and a high fiber diet are safe. If needed, Miralax once daily is a good choice. If no bowel movement after 3 days, you will need to increase the Miralax until soft, regular bowel movements are passing.    Urinating  Because the bladder is disturbed by the surgery, the normal sensation may  be temporarily altered. You may not be aware that your bladder is full. If the bladder is allowed to get over distended, it may make the problem worse. This is why we make sure that you are able to empty your bladder adequately before you go home. For the first few days at home, you should make a point to empty your bladder every 3 to 4 hours. Pain with voiding, especially after the first day, is not expected and may represent a bladder infection.    Activity  For the first two days post-operatively, your soreness and recovery from anesthesia will limit your activity  Stairs are safe, just take your time  At a minimum during this time, you should walk around for 10-15 minutes every 2-3 hours. After that, in the first week, any activity except for overt exercise is safe.   During the first week you should not commit to being on your feet for more than 30 minutes at a time.   During the second week, light exercise is encouraged.  After 2 weeks from surgery, you should try to get back into regular activity other than heavy lifting.   For healing, please limit the amount of weight lifted to 8-10 pounds (a gallon of milk) for the first 6 weeks after surgery.   Driving is usually okay after the first few days. You must be able to comfortably wear a seatbelt, press the gas/brake pedals, and drive defensively. You may not drive while taking narcotic pain medicines.    When to Call the Doctor  Call for any fever above 100.4 F (If you do not feel feverish you do not have to routinely check your temperature.)  Call for severe pain not improved by medications  Call for persistent nausea, vomiting  Call for vaginal bleeding that is heavy as a period or passing blood clots larger than a quarter  Call for unusual swelling in your legs  Call if the incisions develop painful redness and discharge    In an emergency, call 911 or go to an Emergency Department at a nearby hospital

## 2024-09-17 NOTE — ASSESSMENT & PLAN NOTE
Sharon Lino is a 39 y.o. female presenting for Robotic assisted total laparoscopic hysterectomy, bilateral salpingectomy, right oophorectomy, excision of endometriosis, and cystoscopy for BRCA 2 +and history of endometriosis.

## 2024-09-17 NOTE — ANESTHESIA PREPROCEDURE EVALUATION
Patient: Sharon Lino    Procedure Information       Date/Time: 09/17/24 1045    Procedure: Robotic assisted total laparoscopic hysterectomy, bilateral salpingoophorectomy, exicsion of endometriosis, cystoscopy and all indicated procedures (Abdomen)    Location: [facility] OR  / [facility] OR    Surgeons: Dina Wong MD            Relevant Problems   Anesthesia   (+) PONV (postoperative nausea and vomiting)      Cardiac   (+) Breast pain, left   (+) Thoracic back pain      Neuro   (+) Anxiety   (+) Primary dysthymia      GI   (+) Irritable bowel syndrome with diarrhea      /Renal (within normal limits)      Endocrine   (+) Gestational diabetes mellitus (GDM) affecting pregnancy, antepartum ([facility]-HCC)      Musculoskeletal   (+) Rheumatoid arthritis (Multi)   (+) Rheumatoid arthritis involving left hip (Multi)      GYN   (+) Endometriosis determined by laparoscopy       Clinical information reviewed:   Tobacco  Allergies  Meds   Med Hx  Surg Hx   Fam Hx  Soc Hx        NPO Detail:  NPO/Void Status  Date of Last Liquid: 09/16/24  Time of Last Liquid: 1900  Date of Last Solid: 09/16/24  Time of Last Solid: 1800         Physical Exam    Airway  Mallampati: II  TM distance: >3 FB  Neck ROM: full     Cardiovascular - normal exam  Rhythm: regular  Rate: normal     Dental    Pulmonary - normal exam     Abdominal - normal exam           Anesthesia Plan    History of general anesthesia?: yes  History of complications of general anesthesia?: no    ASA 2     general     The patient is not a current smoker.    Anesthetic plan and risks discussed with patient.  Use of blood products discussed with patient who.    Plan discussed with [facility] and attending.

## 2024-09-17 NOTE — ANESTHESIA PROCEDURE NOTES
Peripheral IV  Date/Time: 9/17/2024 12:25 PM      Placement  Needle size: 20 G  Laterality: left  Location: hand  Local anesthetic: none  Site prep: alcohol  Technique: anatomical landmarks  Attempts: 1

## 2024-09-17 NOTE — OP NOTE
Date: 2024  OR Location: [facility] OR    Name: Sharon Lino, : 1985, Age: 39 y.o., MRN: [id number], Sex: female    Diagnosis  Pre-op Diagnosis      * Endometriosis determined by laparoscopy [N80.9]  Pelvic pain  Adnexal mass  BRCA2 Post-op Diagnosis   Same     Procedures  Robotic assisted total laparoscopic hysterectomy  Bilateral salpingectomy  Right oophorectomy  Left ovarian cystectomy  Lysis of adhesions  Bilateral ureterolysis  Excision of endometriosis  Cystoscopy    Surgeons      * Dina Wong - Primary    Resident/Fellow/Other Assistant:  Surgeons and Role:     * Aakash Jefferson MD - Assisting     * Lia Cuenca MD - Assisting     * Svetlana Catherine PA-C - DANK First Assist    Procedure Summary  Anesthesia: General  ASA: II  Anesthesia Staff: Anesthesiologist: Vijay Bro MD  C-AA: RAÚL Hernandez; RAÚL Mccloud; RAÚL Hernandez  Estimated Blood Loss: 50 mL  Intra-op Medications:   Administrations occurring from 1045 to 1500 on 24:   Medication Name Total Dose   scopolamine (Transderm-Scop) patch 1 patch 1 patch   acetaminophen (Tylenol) tablet 975 mg 975 mg   celecoxib (CeleBREX) capsule 400 mg 400 mg   gabapentin (Neurontin) capsule 600 mg 600 mg   phenazopyridine (Pyridium) tablet 200 mg 200 mg              Anesthesia Record               Intraprocedure I/O Totals          Intake    LR infusion 1000.00 mL    Total Intake 1000 mL       Output    Urine 130 mL    Est. Blood Loss 50 mL    Total Output 180 mL       Net    Net Volume 820 mL          Specimen:   ID Type Source Tests Collected by Time   1 : Left Ovarian Cyst Tissue OVARY CYSTECTOMY LEFT SURGICAL PATHOLOGY EXAM Dina Wong MD 2024 1352   2 : Left perirectal fossa Tissue SOFT TISSUE RESECTION SURGICAL PATHOLOGY EXAM Dina Wong MD 2024 1445   3 : Cul De Sac Tissue CUL DE SAC EXCISION SURGICAL PATHOLOGY EXAM Dina Wong MD 2024 7207   4 : Right ureterosacral ligament Tissue LIGAMENT  SURGICAL PATHOLOGY EXAM Dina Wong MD 9/17/2024 1449   5 : Left ureterosacral ligament Tissue LIGAMENT SURGICAL PATHOLOGY EXAM Dina Wong MD 9/17/2024 1451   6 : uterus, cervix, right fallopian tube and ovary Tissue UTERUS, CERVIX, FALLOPIAN TUBE AND OVARY RIGHT SURGICAL PATHOLOGY EXAM Dina Wong MD 9/17/2024 1504        Staff:   Relief Circulator: Jean Paul Brady Circulator: [place]  Scrub Person: Poonam  Circulator: Dariusz  Scrub Person: Jean Paul  Circulator: Patrick Brady Circulator: Elvi Brady Scrub: Heydi    Findings  Right Hemidiaphragm: normal, no evidence of endometriosis  Left Hemidiaphragm: normal, no evidence of endometriosis  Liver Edge: normal  Stomach Edge: normal     Large Intestines: rectosigmoid adherent to posterior cervix and bilateral ovaries         Small Intestines: normal, no evidence of endometriosis  Appendix: normal appearing, no evidence of endometriosis    Right Ovary: enlarged with multiple endometriomas. Adherent to the posterior uterus, cervix and rectum in the midline  Right Fallopian Tube: normal  Left Ovary: large endometrioma. Adherent to left pelvic sidewall, posterior uterus, uterosacral ligament  Left Fallopian Tube: normal  Uterus: 8 week size     Right Pelvic Sidewall: adherent to right ovary with retroperitoneal fibrosis  Right Uterosacral Ligament: fibrotic, adherent to ovary and bowel  Left Pelvic Sidewall: adherent to left ovary with retroperitoneal fibrosis  Left Uterosacral Ligament: fibrotic, adherent to ovary and bowel with evidence of endometriosis  Posterior Cul De Sac: obliterated. Rectosigmoid, uterosacral ligaments and posterior cervix adherent.   Bladder Peritoneum: no evidence of endometriosis     Description of Procedure  After obtaining verbal and written consent, the patient was taken to the operating room with adequate intravenous access. At this point the patient was placed in supine position, bilateral sequential compression devices were  placed on her lower extremities, and general anesthesia was administered. The patient was then prepped and draped in dorsal lithotomy position. Examination under anesthesia showed the above findings. A hurtado catheter was placed for drainage of the bladder. A speculum was placed into the vagina with good visualization of the cervix. The anterior lip of the cervix was grasped with a  single tooth tenaculum. The uterus was sounded and sequentially dilated. A Vcare manipulator was placed. The speculum and tenaculum were removed from the vagina.     Attention was then turned to abdominal entry.The abdomen was entered at [place]'s point. An 8 mm skin incision was placed here and an 8 mm Airseal port was placed under direct visualization via the Optiview method with the laparoscope. Correct placement was confirmed with a low opening pressure and visualization of the intraabdominal contents. The abdomen was then insufflated to 15mmHg and the area directly under the trocar was visualized to confirm that there was no visceral or vessel injury. A complete upper abdominal survey was completed with the findings as above. Four additional robotic ports were placed atraumatically under direct visualization. The patient was placed in Trendelenberg and attention was turned to the pelvis. The robot was docked.    The hysterectomy was then started on the right side of the uterus. The round ligament was identified, transected and the pelvic sidewall peritoneum was incised. This incision was extended parallel to the infundibulopelvic ligament and the retroperitoneal space was entered. The anterior leaf of the broad ligament was incised toward the cervix. The vesicouterine space was identified was dissected to create the bladder flap. The right ovary was noted to be densely adherent to the right pelvic sidewall. The ureter was identified in the retroperitoneum and complete ureterolysis was performed given retroperitoneal fibrosis and to  safe margin for ovary removal. The IP ligament was then desiccated and transected. The ureter was then followed to where it crossed under the uterine artery. The uterine artery was dissected and isolated at its origin and was coagulated while maintain good distance from the ureter. Attention was then turned to the left side. The round ligament was identified, transected and the pelvic sidewall peritoneum was incised. This incision was extended parallel to the infundibulopelvic ligament and the retroperitoneal space was entered. The anterior leaf of the broad ligament was incised toward the cervix. The ureter was identified in the retroperitoneum and complete ureterolysis was performed given retroperitoneal fibrosis and to provide adequate margin for sidewall dissection. The uterine artery was dissected and isolated at its origin and was coagulated while maintain good distance from the ureter. A window was then made in the posterior broad ligament. A large endometrioma was noted on this side. The ovary was incised, the cyst wall was completely  from the ovary, removed and sent to pathology. The ovary was reapproximated with 2-0 vloc and was hemostatic. Given that the patient is BRCA2 positive and will need left oophorectomy and the degree of retroperitoneal fibrosis and inflammation, the decision was made to pexy the ovary to decrease adhesions and difficulty with subsequent oophorectomy. The ovary was was pexied to the anterior abdominal wall with the 2-0 vloc.  No areas of peritoneum were noted to remain on the left pelvic sidewall.    Attention was then turned to the posterior cul-de-sac. The cul de sac was obliterated. A rectal probe was placed to deviate the rectum and ensure it was far away from the excision site. The perirectal fossa were entered just medial to the uterosacral ligament and this plane was developed with blunt dissection until the bowel was no longer adhered laterally to the uterosacral  ligaments. The perirectal fossa and posterior culdesac was developed with blunt dissection.  Care was taken to avoid injury to the underlying rectum and bowel.  After continued dissection and tunnel was able to be created anterior to the rectum and under the cervix. The scar tissue was then incised with monopolar energy. This induration peritoneum was then  excised using a combination of monopolar energy and blunt dissection until the rectum was completely mobilized and free of fibrotic tissue. The rectum was examined and no endometriosis or injury was identified.      Attention was turned to bilateral uterosacral ligaments where the peritoneum was tented out, monopolar scissors were used to excise the endometriosis with wide margins down to healthy unscarred tissue, and the specimens were handed off for pathology.  The uterine vessels were then skeletonized and the ureter further lateralized. The uterine artery was then desiccated and transected with excellent hemostasis obtained. Attention was then turned to the left side of the hysterectomy. The same steps were repeated on the left side. The colpotomy was made circumferentially until the uterus and cervix were  from the vagina and removed intact.    The colpotomy was then closed in two layers with 0-Vlock barbed suture. The pelvis was irrigated. All pedicles and areas of dissection were inspected and hemostasis was noted. The insufflation pressure was decreased to 5 mmHg and hemostasis was again noted. The robot was undocked. The abdomen was desufflated and 5 positive pressure breaths were administered by the anesthesiologist. All ports and instruments were removed from the abdomen. All skin incisions were closed with 4-0 monocryl suture and covered with dermabond. 0.5% bupivicaine was injected at all trocar sites.      Attention was then turned to the cystoscopy. The Salamanca catheter was removed. A cystoscope was carefully introduced into the urethra into  the bladder. The bladder was distended with sterile water. Vigorous jets were noted from the bilateral ureteral orifices. Once these ureters were found to be functioning adequately, attention was then turned to a complete surveillance of the remainder of the bladder. This was done in a clockwise fashion, noting no evidence of traumatic injury or sutures in the bladder. The bladder was decompressed. The cystoscope was carefully removed. The vagina was inspected and no vaginal lacerations were noted. All instruments were removed from the operative field. The patient was taken out of dorsal lithotomy position. She was awakened without difficulty and transferred to the recovery room in stable condition. Sponge, lap and needle counts were correct x2.     This was procedure was substantially more complicated and required increase time and surgical expertise due to deep infiltrating endometriosis, retroperitoneal fibrosis, and lysis of adhesion equaling 90 minutes.      I was present and scrubbed for the entirety of the surgical procedure.    Dina Wong MD  Division of Minimally Invasive Gynecologic Surgery  [facility]

## 2024-09-17 NOTE — ANESTHESIA POSTPROCEDURE EVALUATION
Patient: Sharon Lino    Procedure Summary       Date: 09/17/24 Room / Location: [facility] OR 05 / [facility] OR    Anesthesia Start: 1200 Anesthesia Stop:     Procedure: Robotic assisted total laparoscopic hysterectomy, bilateral salpingectomy, right oophorectomy, left ovarian cystectomy, lysis of ahesions, bilateral ureterolysis, exicsion of endometriosis, cystoscopy (Abdomen) Diagnosis:       Endometriosis determined by laparoscopy      (Endometriosis determined by laparoscopy [N80.9])    Surgeons: Dina Wong MD Responsible Provider: Vijay Bro MD    Anesthesia Type: general ASA Status: 2            Anesthesia Type: general    Vitals Value Taken Time   /61 09/17/24 1553   Temp 37.6 09/17/24 1557   Pulse 94 09/17/24 1555   Resp 19 09/17/24 1555   SpO2 100 % 09/17/24 1555   Vitals shown include unfiled device data.    Anesthesia Post Evaluation    Patient location during evaluation: PACU  Patient participation: complete - patient participated  Level of consciousness: awake  Pain management: adequate  Airway patency: patent  Cardiovascular status: acceptable  Respiratory status: acceptable  Hydration status: acceptable  Postoperative Nausea and Vomiting: none  Comments: Patient SV on 8 L/min O2 with SFM.  VSS.  Report given to nurse.        No notable events documented.

## 2024-09-17 NOTE — NURSING NOTE
1212- pt ordered 600 mg of gabapentin, pt received 1200 mg pass report. Documented. Provider Tammie Avina notified.Pt will be notified and will be educated on increased drowsiness per anesthesia.

## 2024-09-18 VITALS
SYSTOLIC BLOOD PRESSURE: 94 MMHG | BODY MASS INDEX: 29.76 KG/M2 | WEIGHT: 189.6 LBS | HEART RATE: 75 BPM | OXYGEN SATURATION: 98 % | HEIGHT: 67 IN | TEMPERATURE: 97.2 F | RESPIRATION RATE: 16 BRPM | DIASTOLIC BLOOD PRESSURE: 60 MMHG

## 2024-09-18 PROCEDURE — 99024 POSTOP FOLLOW-UP VISIT: CPT | Performed by: STUDENT IN AN ORGANIZED HEALTH CARE EDUCATION/TRAINING PROGRAM

## 2024-09-18 PROCEDURE — 2500000004 HC RX 250 GENERAL PHARMACY W/ HCPCS (ALT 636 FOR OP/ED)

## 2024-09-18 PROCEDURE — 2500000001 HC RX 250 WO HCPCS SELF ADMINISTERED DRUGS (ALT 637 FOR MEDICARE OP)

## 2024-09-18 PROCEDURE — 7100000011 HC EXTENDED STAY RECOVERY HOURLY - NURSING UNIT

## 2024-09-18 ASSESSMENT — PAIN SCALES - GENERAL
PAINLEVEL_OUTOF10: 6
PAINLEVEL_OUTOF10: 7
PAINLEVEL_OUTOF10: 3

## 2024-09-18 NOTE — CARE PLAN
The patient's goals for the shift include      The clinical goals for the shift include Adequate pain control    Pt had adequate pain control with scheduled and prn medications. Pt meeting milestones adequate for discharge.  Problem: Pain  Goal: Takes deep breaths with improved pain control throughout the shift  Outcome: Adequate for Discharge  Goal: Turns in bed with improved pain control throughout the shift  Outcome: Adequate for Discharge  Goal: Walks with improved pain control throughout the shift  Outcome: Adequate for Discharge  Goal: Performs ADL's with improved pain control throughout shift  Outcome: Adequate for Discharge  Goal: Participates in PT with improved pain control throughout the shift  Outcome: Adequate for Discharge  Goal: Free from opioid side effects throughout the shift  Outcome: Adequate for Discharge  Goal: Free from acute confusion related to pain meds throughout the shift  Outcome: Adequate for Discharge     Problem: Skin  Goal: Decreased wound size/increased tissue granulation at next dressing change  Outcome: Adequate for Discharge  Goal: Participates in plan/prevention/treatment measures  Outcome: Adequate for Discharge  Goal: Prevent/manage excess moisture  Outcome: Adequate for Discharge  Goal: Prevent/minimize sheer/friction injuries  Outcome: Adequate for Discharge  Goal: Promote/optimize nutrition  Outcome: Adequate for Discharge  Goal: Promote skin healing  Outcome: Adequate for Discharge     Problem: Pain - Adult  Goal: Verbalizes/displays adequate comfort level or baseline comfort level  Outcome: Adequate for Discharge     Problem: Safety - Adult  Goal: Free from fall injury  Outcome: Adequate for Discharge     Problem: Discharge Planning  Goal: Discharge to home or other facility with appropriate resources  Outcome: Adequate for Discharge     Problem: Chronic Conditions and Co-morbidities  Goal: Patient's chronic conditions and co-morbidity symptoms are monitored and  maintained or improved  Outcome: Adequate for Discharge

## 2024-09-18 NOTE — DISCHARGE SUMMARY
Discharge Summary    Admission Date: 9/17/2024  Discharge Date: 9/18/2024    Discharge Diagnosis  Endometriosis determined by laparoscopy    Hospital Course  Patient was admitted on 9/17 for scheduled surgery. She underwent a robotic-assisted TLH, BS, R oophorectomy, endometriosis excision, and cystoscopy . Her procedure was uncomplicated, see the operative report for full details. She was admitted for observation per pt preference due to difficulty voiding. By POD#1 she was meeting all postoperative milestones including: tolerating PO diet and medications, voiding, ambulating. Her pain was well controlled with PO pain medications. She was appropriate for discharge home and will follow up as an outpatient with Dr. Wong.    Last Vitals:  Temp Pulse Resp BP MAP Pulse Ox   36.6 °C (97.9 °F) 80 18 92/56 68 (!) 95 %     Discharge Meds     Your medication list        START taking these medications        Instructions Last Dose Given Next Dose Due   acetaminophen 500 mg tablet  Commonly known as: Tylenol      Take 2 tablets (1,000 mg) by mouth every 6 hours if needed for mild pain (1 - 3).       ibuprofen 800 mg tablet      Take 1 tablet (800 mg) by mouth every 6 hours if needed for mild pain (1 - 3).       ondansetron 4 mg tablet  Commonly known as: Zofran      Take 1 tablet (4 mg) by mouth every 12 hours if needed for nausea or vomiting. Take 1 tablet (4 mg) by mouth twice a day as needed for 7 days.       oxyCODONE 5 mg immediate release tablet  Commonly known as: Roxicodone      Take 1 tablet (5 mg) by mouth every 6 hours if needed for severe pain (7 - 10).       polyethylene glycol 17 gram packet  Commonly known as: Glycolax, Miralax      Take 17 g by mouth once daily.              CONTINUE taking these medications        Instructions Last Dose Given Next Dose Due   ALPRAZolam 0.5 mg tablet  Commonly known as: Xanax      Take 1 tablet (0.5 mg) by mouth 3 times a day as needed for anxiety for up to 6 days.        citalopram 10 mg tablet  Commonly known as: CeleXA      Take 1 tablet (10 mg) by mouth once daily.       meloxicam 15 mg tablet  Commonly known as: Mobic      Take 1 tablet (15 mg) by mouth once daily as needed for moderate pain (4 - 6).       Mounjaro 2.5 mg/0.5 mL pen injector  Generic drug: tirzepatide           omeprazole OTC 20 mg EC tablet  Commonly known as: PriLOSEC OTC                  STOP taking these medications      omeprazole 40 mg DR capsule  Commonly known as: PriLOSEC                  Where to Get Your Medications        These medications were sent to [id number] - [place], [place] - [address]  [address],  [address]      Phone: [phone]   acetaminophen 500 mg tablet  ibuprofen 800 mg tablet  ondansetron 4 mg tablet  oxyCODONE 5 mg immediate release tablet  polyethylene glycol 17 gram packet            Test Results Pending At Discharge  Pending Labs       Order Current Status    Surgical Pathology Exam In process            Outpatient Follow-Up  Future Appointments   Date Time Provider Department Center   10/22/2024 11:00 AM Dina Wong MD [id number] Randal Jefferson MD

## 2024-09-18 NOTE — PROGRESS NOTES
"Sharon Lino is a 39 y.o. female on day 0 of admission presenting with Endometriosis determined by laparoscopy.    Subjective   Pt resting comfortably, reports pain is well controlled with medications. Denies CP, SOB, nausea/vomiting. Tolerating PO without nausea. Ambulating to bathroom, voiding.        Objective     Physical Exam  General: no acute distress  HEENT: normocephalic, atraumatic  Heart: warm and well perfused, RRR  Lungs: no increased WOB on RA, CTAB  Abd: soft, appropriately tender to palpation throughout. Laparoscopic incisions covered in dressing, c/d/I without strikethrough  Extremities: moving all extremities, SCDs in place  Neuro: awake and conversant  Psych: appropriate mood and affect      Last Recorded Vitals  Blood pressure 97/62, pulse 83, temperature 36.1 °C (97 °F), temperature source Temporal, resp. rate 18, height 1.702 m (5' 7\"), weight 86 kg (189 lb 9.5 oz), SpO2 97%.  Intake/Output last 3 Shifts:  I/O last 3 completed shifts:  In: 2211.7 (25.7 mL/kg) [P.O.:120; I.V.:2091.7 (24.3 mL/kg)]  Out: 180 (2.1 mL/kg) [Urine:130 (0 mL/kg/hr); Blood:50]  Weight: 86 kg     Relevant Results  Lab Results   Component Value Date    WBC 6.9 09/05/2024    HGB 14.2 09/05/2024    HCT 43.2 09/05/2024    MCV 85 09/05/2024     09/05/2024      Lab Results   Component Value Date    GLUCOSE 91 12/08/2022    CALCIUM 8.9 12/08/2022     12/08/2022    K 4.2 12/08/2022    CO2 26 12/08/2022     12/08/2022    BUN 13 12/08/2022    CREATININE 0.64 12/08/2022           Assessment/Plan   40yo F now s/p robotic-assisted TLH, BS, R oophorectomy, endometriosis excision, and cystoscopy (DOS 9/17) for BRCA 2 pos and h/o endometriosis     Postoperative   - follow ERAS protocol, pain well controlled  - preop Hgb 14.2, EBL 50cc  - stable on RA,  encourage IS  - regular diet, IV antiemetics ordered PRN. Fluids KVO   - initial difficulty voiding in PACU, now s/p void, strict Is/Os  - DVT ppx - SCDs    To be " d/w Dr. Hebert in   Myesha Dominguez MD  PGY3, Obstetrics and Gynecology

## 2024-09-18 NOTE — HOSPITAL COURSE
Patient was admitted on 9/17 for scheduled surgery. She underwent a robotic-assisted TLH, BS, R oophorectomy, endometriosis excision, and cystoscopy . Her procedure was uncomplicated, see the operative report for full details. She was admitted for observation per pt preference due to difficulty voiding. By POD#1 she was meeting all postoperative milestones including: tolerating PO diet and medications, voiding, ambulating. Her pain was well controlled with PO pain medications. She was appropriate for discharge home and will follow up as an outpatient with Dr. Wong.

## 2024-09-23 ENCOUNTER — TELEPHONE (OUTPATIENT)
Dept: OBSTETRICS AND GYNECOLOGY | Facility: CLINIC | Age: 39
End: 2024-09-23
Payer: COMMERCIAL

## 2024-09-23 LAB
LABORATORY COMMENT REPORT: NORMAL
PATH REPORT.FINAL DX SPEC: NORMAL
PATH REPORT.GROSS SPEC: NORMAL
PATH REPORT.RELEVANT HX SPEC: NORMAL
PATH REPORT.TOTAL CANCER: NORMAL

## 2024-09-23 NOTE — TELEPHONE ENCOUNTER
Called patient regarding symptoms. Pt had TRH-BS, RO, EOE with extensive lysis of adhesions 2/2 DIE on 9/17/24. Patient has been feeling well postop until today. States she feels lethargic and run down, similar to when she has a viral illness. Took her temperature and had a fever of 102.3, which went down to 99.9 on repeat. Last took tylenol and motrin at 10:30.   Reports her pain is well controlled. No longer needing narcotics and has minimal pain. Taking tylenol/motrin as needed. Tolerating liquids and PO Without vomiting. Has mild nausea. Passing flatus and BM. Voiding without difficulty or dysuria. Denies incisional redness.  Discussed with patient that if that 102.3 was accurate, that would definitively not be normal after surgery and could be a sign of a postoperative infection. Given the degree of endometriosis and inflammation could be a risk for pelvic abscess vs surgical complication, although she has minimal symptoms other than fevers/chills. Recommend ER evaluation for bloodwork and imaging. Patient is hesitant to go to the ER unless symptoms persist. Patient will take a covid test. If negative and she has another fever or symptoms do not improve by this evening/tomorrow morning then patient will present to the ER for evaluation. Discussed ER precautions including another fever, N/V, worsening abdominal pain, incisional redness or persistent  symptoms. Patient verbalized understanding.     Dina Wong MD  Division of Minimally Invasive Gynecologic Surgery  [facility]

## 2024-09-23 NOTE — TELEPHONE ENCOUNTER
Pt took her temp around 1 pm and it was 102.3, retook it and it was 99.9. Feels unwell, said she has chills on and off. Last took Tylenol and Motrin at 10:30 am. Secure chat sent to Dr. Wong.

## 2024-10-08 ENCOUNTER — APPOINTMENT (OUTPATIENT)
Dept: OBSTETRICS AND GYNECOLOGY | Facility: CLINIC | Age: 39
End: 2024-10-08
Payer: COMMERCIAL

## 2024-10-21 NOTE — PROGRESS NOTES
Division of Minimally Invasive Gynecologic Surgery  [facility]  CC: post-op visit    Subjective     History of Present Illness:   Ms. Lino is a 39 y.o. who presents for post-op visit.     Date: 9/17/24  Surgery: TRH-BS, EOE, Right oohporectomy, left ovarian cystectomy, ENIO, ureterolysis  Findings: adhesions of rectosigmoid to posterior cervix and bilateral ovaries. Enlarged right ovary with multiple endometriomas and adherent to uterus, sidewall and bowel. Left ovary with large endometrioma. Bilateral pelvic sidewalls with endometriosis.     Pathology:   A. OVARY  LEFT:   --FRAGMENTS OF OVARY WITH ENDOMETRIOTIC CYST     B. SOFT TISSUE RESECTION:   -- FIBROADIPOSE TISSUE, NO PATHOLOGIC DIAGNOSIS     C. CUL DE SAC EXCISION:   --FIBROADIPOSE TISSUE, NO PATHOLOGIC DIAGNOSIS     D. LIGAMENT:   --ENDOMETRIOSIS     E.  SPECIMEN LABELED LIGAMENT:   --FIBROADIPOSE TISSUE WITH ENDOMETRIOSIS INTERMIXED WITH FRAGMENTS OF CORPUS LUTEUM AND OVARIAN STROMA WITH HEMOSIDERIN     F. UTERUS, CERVIX, FALLOPIAN TUBE AND OVARY RIGHT:   --ENDOMETRIUM: MID SECRETORY PATTERN  --MYOMETRIUM: ADENOMYOSIS; MULTIPLE LEIOMYOMAS  --CERVIX: NO PATHOLOGIC DIAGNOSIS  --FALLOPIAN TUBES: NO PATHOLOGIC DIAGNOSIS  -- RIGHT OVARY: ENDOMETRIOTIC CYST; MICROSCOPIC BRI TUMOR      Since her surgery, she is doing well. She denies fevers, chills, incisional complaints.        Past Medical History:   Diagnosis Date    Depression     Dysfunctional uterine bleeding     Dysphagia     Esophageal dysphagia    Fibroid     Genetic susceptibility to malignant neoplasm of breast     BRCA2 positive, s/p b/l Mastectomy    GERD (gastroesophageal reflux disease)     HELLP syndrome     2017    History of blood transfusion     2017 after giving birth    History of endometriosis     Other acute postprocedural pain 08/04/2020    Post-op pain    PONV (postoperative nausea and vomiting)     Rheumatoid arthritis     Rheumatoid arthritis         Past Surgical History:   Procedure Laterality Date    APPENDECTOMY      DILATION AND CURETTAGE OF UTERUS  10/16/2018    Dilation And Curettage    ESOPHAGOGASTRODUODENOSCOPY      LAPAROSCOPY DIAGNOSTIC / BIOPSY / ASPIRATION / LYSIS  2017    Laparoscopy (Diagnostic)    MASTECTOMY Bilateral     bilateral mastectomy in     OTHER SURGICAL HISTORY  2021    Breast reconstruction    OTHER SURGICAL HISTORY      Novasure ablation    TONSILLECTOMY  2017    Tonsillectomy       Social History     Socioeconomic History    Marital status:      Spouse name: Not on file    Number of children: Not on file    Years of education: Not on file    Highest education level: Not on file   Occupational History    Not on file   Tobacco Use    Smoking status: Former     Current packs/day: 0.00     Types: Cigarettes     Quit date:      Years since quittin.8    Smokeless tobacco: Never   Vaping Use    Vaping status: Never Used   Substance and Sexual Activity    Alcohol use: Yes     Comment: socially    Drug use: Never    Sexual activity: Not on file   Other Topics Concern    Not on file   Social History Narrative    Not on file     Social Drivers of Health     Financial Resource Strain: Low Risk  (2024)    Overall Financial Resource Strain (CARDIA)     Difficulty of Paying Living Expenses: Not hard at all   Food Insecurity: Not on file   Transportation Needs: No Transportation Needs (2024)    PRAPARE - Transportation     Lack of Transportation (Medical): No     Lack of Transportation (Non-Medical): No   Physical Activity: Not on file   Stress: Not on file   Social Connections: Not on file   Intimate Partner Violence: Not on file   Housing Stability: Low Risk  (2024)    Housing Stability Vital Sign     Unable to Pay for Housing in the Last Year: No     Number of Times Moved in the Last Year: 0     Homeless in the Last Year: No         Current Outpatient Medications:     acetaminophen  (Tylenol) 500 mg tablet, Take 2 tablets (1,000 mg) by mouth every 6 hours if needed for mild pain (1 - 3)., Disp: 30 tablet, Rfl: 0    ALPRAZolam (Xanax) 0.5 mg tablet, Take 1 tablet (0.5 mg) by mouth 3 times a day as needed for anxiety for up to 6 days., Disp: 18 tablet, Rfl: 0    citalopram (CeleXA) 10 mg tablet, Take 1 tablet (10 mg) by mouth once daily., Disp: 90 tablet, Rfl: 3    ibuprofen 800 mg tablet, Take 1 tablet (800 mg) by mouth every 6 hours if needed for mild pain (1 - 3)., Disp: 30 tablet, Rfl: 0    meloxicam (Mobic) 15 mg tablet, Take 1 tablet (15 mg) by mouth once daily as needed for moderate pain (4 - 6)., Disp: 90 tablet, Rfl: 3    Mounjaro 2.5 mg/0.5 mL pen injector, INJECT 2.5MG SUBCUTANEOUSLY ONCE EVERY WEEK, Disp: , Rfl:     omeprazole OTC (PriLOSEC OTC) 20 mg EC tablet, Take 1 tablet (20 mg) by mouth once daily in the morning. Take before meals. Do not crush, chew, or split., Disp: , Rfl:     ondansetron (Zofran) 4 mg tablet, Take 1 tablet (4 mg) by mouth every 12 hours if needed for nausea or vomiting. Take 1 tablet (4 mg) by mouth twice a day as needed for 7 days., Disp: 14 tablet, Rfl: 0    oxyCODONE (Roxicodone) 5 mg immediate release tablet, Take 1 tablet (5 mg) by mouth every 6 hours if needed for severe pain (7 - 10)., Disp: 15 tablet, Rfl: 0    polyethylene glycol (Glycolax, Miralax) 17 gram packet, Take 17 g by mouth once daily., Disp: 30 each, Rfl: 0    No Known Allergies    Review of Systems    Objective     Vitals:    10/22/24 1105   BP: 114/64       Physical Exam:   General: Well-developed, well-nourished, alert and cooperative female who is oriented ×4 with intact memory.  She appears to be in no acute distress.  Lungs: Normal work of breathing.  Chest symmetrical with good excursion and no accessory muscles in use.  Abdomen soft and nontender.  Incisions healing well.   Extremities:  Regular full range of motion.  No clubbing cyanosis or edema  Skin: Warm and dry.  Intact  without rashes, lesions or ulcers.  Gynecologic: External genitalia with normal appearance and hair distribution. No lesions or ulcers. Vaginal cuff healing well.      Assessment/Plan   Sharon Lino is a 39 y.o. female s/p TRH-BS, EOE, RO, left ovarian cystectomy on 9/17/24 who presents for post-op visit     Path discussed  PT will need left oophorectomy by age 46 for BRCA 2 status  F/u with primary OB/GYN for annuals  Patient pleased with today's visit     Dina Wong MD  Division of Minimally Invasive Gynecologic Surgery

## 2024-10-22 ENCOUNTER — OFFICE VISIT (OUTPATIENT)
Dept: PRIMARY CARE | Facility: CLINIC | Age: 39
End: 2024-10-22
Payer: COMMERCIAL

## 2024-10-22 ENCOUNTER — APPOINTMENT (OUTPATIENT)
Dept: OBSTETRICS AND GYNECOLOGY | Facility: CLINIC | Age: 39
End: 2024-10-22
Payer: COMMERCIAL

## 2024-10-22 VITALS
BODY MASS INDEX: 30.13 KG/M2 | WEIGHT: 192 LBS | RESPIRATION RATE: 14 BRPM | HEIGHT: 67 IN | TEMPERATURE: 97.3 F | HEART RATE: 72 BPM | OXYGEN SATURATION: 97 % | DIASTOLIC BLOOD PRESSURE: 60 MMHG | SYSTOLIC BLOOD PRESSURE: 110 MMHG

## 2024-10-22 VITALS
SYSTOLIC BLOOD PRESSURE: 114 MMHG | WEIGHT: 189 LBS | HEIGHT: 67 IN | BODY MASS INDEX: 29.66 KG/M2 | DIASTOLIC BLOOD PRESSURE: 64 MMHG

## 2024-10-22 DIAGNOSIS — Z00.00 WELLNESS EXAMINATION: Primary | ICD-10-CM

## 2024-10-22 DIAGNOSIS — M25.50 ARTHRALGIA, UNSPECIFIED JOINT: ICD-10-CM

## 2024-10-22 DIAGNOSIS — F41.9 ANXIETY AND DEPRESSION: ICD-10-CM

## 2024-10-22 DIAGNOSIS — F32.A ANXIETY AND DEPRESSION: ICD-10-CM

## 2024-10-22 DIAGNOSIS — E55.9 VITAMIN D DEFICIENCY: ICD-10-CM

## 2024-10-22 DIAGNOSIS — Z09 POSTOP CHECK: Primary | ICD-10-CM

## 2024-10-22 DIAGNOSIS — Z90.13 ACQUIRED ABSENCE OF BOTH BREASTS: ICD-10-CM

## 2024-10-22 PROBLEM — G89.29 ABDOMINAL PAIN, CHRONIC, GENERALIZED: Status: RESOLVED | Noted: 2023-04-27 | Resolved: 2024-10-22

## 2024-10-22 PROBLEM — H16.002 ULCER OF LEFT CORNEA: Status: RESOLVED | Noted: 2023-04-27 | Resolved: 2024-10-22

## 2024-10-22 PROBLEM — K59.00 CONSTIPATION: Status: RESOLVED | Noted: 2023-04-27 | Resolved: 2024-10-22

## 2024-10-22 PROBLEM — Z98.890 PONV (POSTOPERATIVE NAUSEA AND VOMITING): Status: RESOLVED | Noted: 2024-09-17 | Resolved: 2024-10-22

## 2024-10-22 PROBLEM — O24.419: Status: RESOLVED | Noted: 2023-04-27 | Resolved: 2024-10-22

## 2024-10-22 PROBLEM — R92.8 ABNORMAL MRI, BREAST: Status: RESOLVED | Noted: 2023-04-27 | Resolved: 2024-10-22

## 2024-10-22 PROBLEM — R92.8 ABNORMAL FINDING ON BREAST IMAGING: Status: RESOLVED | Noted: 2023-04-27 | Resolved: 2024-10-22

## 2024-10-22 PROBLEM — N94.6 DYSMENORRHEA: Status: RESOLVED | Noted: 2023-04-27 | Resolved: 2024-10-22

## 2024-10-22 PROBLEM — M77.9 TENDONITIS: Status: RESOLVED | Noted: 2023-04-27 | Resolved: 2024-10-22

## 2024-10-22 PROBLEM — O35.EXX0: Status: RESOLVED | Noted: 2023-04-27 | Resolved: 2024-10-22

## 2024-10-22 PROBLEM — R10.84 ABDOMINAL PAIN, CHRONIC, GENERALIZED: Status: RESOLVED | Noted: 2023-04-27 | Resolved: 2024-10-22

## 2024-10-22 PROBLEM — R10.2 PELVIC PAIN: Status: RESOLVED | Noted: 2024-08-08 | Resolved: 2024-10-22

## 2024-10-22 PROBLEM — N64.4 BREAST PAIN, LEFT: Status: RESOLVED | Noted: 2023-04-27 | Resolved: 2024-10-22

## 2024-10-22 PROBLEM — N93.9 VAGINAL BLEEDING, ABNORMAL: Status: RESOLVED | Noted: 2023-04-27 | Resolved: 2024-10-22

## 2024-10-22 PROBLEM — M54.6 THORACIC BACK PAIN: Status: RESOLVED | Noted: 2023-04-27 | Resolved: 2024-10-22

## 2024-10-22 PROBLEM — R10.9 ABDOMINAL CRAMPING: Status: RESOLVED | Noted: 2023-04-27 | Resolved: 2024-10-22

## 2024-10-22 PROBLEM — N63.0 MASS OF BREAST: Status: RESOLVED | Noted: 2023-04-27 | Resolved: 2024-10-22

## 2024-10-22 PROBLEM — R11.2 PONV (POSTOPERATIVE NAUSEA AND VOMITING): Status: RESOLVED | Noted: 2024-09-17 | Resolved: 2024-10-22

## 2024-10-22 PROCEDURE — 3008F BODY MASS INDEX DOCD: CPT | Performed by: INTERNAL MEDICINE

## 2024-10-22 PROCEDURE — 99024 POSTOP FOLLOW-UP VISIT: CPT | Performed by: STUDENT IN AN ORGANIZED HEALTH CARE EDUCATION/TRAINING PROGRAM

## 2024-10-22 PROCEDURE — 90656 IIV3 VACC NO PRSV 0.5 ML IM: CPT | Performed by: INTERNAL MEDICINE

## 2024-10-22 PROCEDURE — 99395 PREV VISIT EST AGE 18-39: CPT | Performed by: INTERNAL MEDICINE

## 2024-10-22 PROCEDURE — 3078F DIAST BP <80 MM HG: CPT | Performed by: STUDENT IN AN ORGANIZED HEALTH CARE EDUCATION/TRAINING PROGRAM

## 2024-10-22 PROCEDURE — 1036F TOBACCO NON-USER: CPT | Performed by: STUDENT IN AN ORGANIZED HEALTH CARE EDUCATION/TRAINING PROGRAM

## 2024-10-22 PROCEDURE — 90471 IMMUNIZATION ADMIN: CPT | Performed by: INTERNAL MEDICINE

## 2024-10-22 PROCEDURE — 3074F SYST BP LT 130 MM HG: CPT | Performed by: STUDENT IN AN ORGANIZED HEALTH CARE EDUCATION/TRAINING PROGRAM

## 2024-10-22 PROCEDURE — 3008F BODY MASS INDEX DOCD: CPT | Performed by: STUDENT IN AN ORGANIZED HEALTH CARE EDUCATION/TRAINING PROGRAM

## 2024-10-22 RX ORDER — CITALOPRAM 10 MG/1
10 TABLET ORAL DAILY
Qty: 90 TABLET | Refills: 3 | Status: SHIPPED | OUTPATIENT
Start: 2024-10-22 | End: 2025-10-22

## 2024-10-22 NOTE — PROGRESS NOTES
"Subjective    Sharon Lino is a 39 y.o. female who presents for Follow-up.  HPI  Follow up  Due for refills  Needs flu vaccine  Pap 2022  Would like to see rheumatologist.   She has pain and stiffness in hands. Has juvenile ra  Review of Systems   All other systems reviewed and are negative.        Objective     /60 (BP Location: Left arm, Patient Position: Sitting, BP Cuff Size: Adult)   Pulse 72   Temp 36.3 °C (97.3 °F) (Skin)   Resp 14   Ht 1.702 m (5' 7\")   Wt 87.1 kg (192 lb)   LMP 12/07/2022   SpO2 97%   BMI 30.07 kg/m²    Physical Exam  Constitutional:       Appearance: Normal appearance.   Cardiovascular:      Rate and Rhythm: Normal rate and regular rhythm.      Pulses: Normal pulses.   Pulmonary:      Effort: Pulmonary effort is normal.      Breath sounds: Normal breath sounds.   Chest:      Chest wall: No mass or tenderness.   Breasts:     Right: Absent. No mass.      Left: Absent. No mass.      Comments: Sp bl mastectomy with reconstruction   Abdominal:      General: Abdomen is flat.   Musculoskeletal:      Cervical back: Neck supple. No tenderness.      Comments: Pip of multiple fingers of both hands are swollen. No heat or redness   Lymphadenopathy:      Cervical: No cervical adenopathy.      Upper Body:      Right upper body: No supraclavicular or axillary adenopathy.      Left upper body: No supraclavicular or axillary adenopathy.   Neurological:      Mental Status: She is alert.   Psychiatric:         Attention and Perception: Attention and perception normal.         Mood and Affect: Mood and affect normal.       Health Maintenance Due   Topic Date Due    Yearly Adult Physical  Never done    HIV Screening  Never done    Hepatitis B Vaccines (1 of 3 - 19+ 3-dose series) Never done    MMR Vaccines (1 of 1 - Standard series) Never done    Varicella Vaccines (1 of 2 - 13+ 2-dose series) Never done    COVID-19 Vaccine (4 - 2024-25 season) 09/01/2024          Assessment/Plan   Problem " List Items Addressed This Visit       Acquired absence of both breasts    Arthralgia    Relevant Orders    EDUARDO with Reflex to MATILDA    Citrulline Antibody, IgG    C-Reactive Protein    Rheumatoid Factor    Sedimentation Rate    TSH with reflex to Free T4 if abnormal    Referral to Rheumatology     Other Visit Diagnoses       Wellness examination    -  Primary    Relevant Orders    CBC    Comprehensive Metabolic Panel    Lipid Panel    Anxiety and depression        Relevant Medications    citalopram (CeleXA) 10 mg tablet    Vitamin D deficiency            She  has hx of rheumatological arthritis. Will check labs and refer to rheum.

## 2024-10-23 ENCOUNTER — LAB (OUTPATIENT)
Dept: LAB | Facility: LAB | Age: 39
End: 2024-10-23
Payer: COMMERCIAL

## 2024-10-23 DIAGNOSIS — Z00.00 WELLNESS EXAMINATION: ICD-10-CM

## 2024-10-23 DIAGNOSIS — M25.50 ARTHRALGIA, UNSPECIFIED JOINT: ICD-10-CM

## 2024-10-23 LAB
ALBUMIN SERPL BCP-MCNC: 4.2 G/DL (ref 3.4–5)
ALP SERPL-CCNC: 68 U/L (ref 33–110)
ALT SERPL W P-5'-P-CCNC: 12 U/L (ref 7–45)
ANION GAP SERPL CALC-SCNC: 14 MMOL/L (ref 10–20)
AST SERPL W P-5'-P-CCNC: 11 U/L (ref 9–39)
BILIRUB SERPL-MCNC: 0.4 MG/DL (ref 0–1.2)
BUN SERPL-MCNC: 13 MG/DL (ref 6–23)
CALCIUM SERPL-MCNC: 9 MG/DL (ref 8.6–10.3)
CCP IGG SERPL-ACNC: <1 U/ML
CHLORIDE SERPL-SCNC: 102 MMOL/L (ref 98–107)
CHOLEST SERPL-MCNC: 208 MG/DL (ref 0–199)
CHOLESTEROL/HDL RATIO: 3.2
CO2 SERPL-SCNC: 24 MMOL/L (ref 21–32)
CREAT SERPL-MCNC: 0.57 MG/DL (ref 0.5–1.05)
CRP SERPL-MCNC: 0.9 MG/DL
EGFRCR SERPLBLD CKD-EPI 2021: >90 ML/MIN/1.73M*2
ERYTHROCYTE [DISTWIDTH] IN BLOOD BY AUTOMATED COUNT: 13.2 % (ref 11.5–14.5)
ERYTHROCYTE [SEDIMENTATION RATE] IN BLOOD BY WESTERGREN METHOD: 24 MM/H (ref 0–20)
GLUCOSE SERPL-MCNC: 78 MG/DL (ref 74–99)
HCT VFR BLD AUTO: 41.6 % (ref 36–46)
HDLC SERPL-MCNC: 64.8 MG/DL
HGB BLD-MCNC: 13.7 G/DL (ref 12–16)
LDLC SERPL CALC-MCNC: 100 MG/DL
MCH RBC QN AUTO: 28.1 PG (ref 26–34)
MCHC RBC AUTO-ENTMCNC: 32.9 G/DL (ref 32–36)
MCV RBC AUTO: 85 FL (ref 80–100)
NON HDL CHOLESTEROL: 143 MG/DL (ref 0–149)
NRBC BLD-RTO: 0 /100 WBCS (ref 0–0)
PLATELET # BLD AUTO: 338 X10*3/UL (ref 150–450)
POTASSIUM SERPL-SCNC: 4 MMOL/L (ref 3.5–5.3)
PROT SERPL-MCNC: 7.4 G/DL (ref 6.4–8.2)
RBC # BLD AUTO: 4.88 X10*6/UL (ref 4–5.2)
RHEUMATOID FACT SER NEPH-ACNC: <10 IU/ML (ref 0–15)
SODIUM SERPL-SCNC: 136 MMOL/L (ref 136–145)
TRIGL SERPL-MCNC: 217 MG/DL (ref 0–149)
TSH SERPL-ACNC: 1.04 MIU/L (ref 0.44–3.98)
VLDL: 43 MG/DL (ref 0–40)
WBC # BLD AUTO: 6.1 X10*3/UL (ref 4.4–11.3)

## 2024-10-23 PROCEDURE — 86200 CCP ANTIBODY: CPT

## 2024-10-23 PROCEDURE — 80053 COMPREHEN METABOLIC PANEL: CPT

## 2024-10-23 PROCEDURE — 85652 RBC SED RATE AUTOMATED: CPT

## 2024-10-23 PROCEDURE — 84443 ASSAY THYROID STIM HORMONE: CPT

## 2024-10-23 PROCEDURE — 86038 ANTINUCLEAR ANTIBODIES: CPT

## 2024-10-23 PROCEDURE — 86235 NUCLEAR ANTIGEN ANTIBODY: CPT

## 2024-10-23 PROCEDURE — 86140 C-REACTIVE PROTEIN: CPT

## 2024-10-23 PROCEDURE — 85027 COMPLETE CBC AUTOMATED: CPT

## 2024-10-23 PROCEDURE — 86225 DNA ANTIBODY NATIVE: CPT

## 2024-10-23 PROCEDURE — 80061 LIPID PANEL: CPT

## 2024-10-23 PROCEDURE — 36415 COLL VENOUS BLD VENIPUNCTURE: CPT

## 2024-10-23 PROCEDURE — 86431 RHEUMATOID FACTOR QUANT: CPT

## 2024-10-25 LAB
ANA PATTERN: ABNORMAL
ANA SER QL HEP2 SUBST: POSITIVE
ANA TITR SER IF: ABNORMAL {TITER}
CENTROMERE B AB SER-ACNC: <0.2 AI
CHROMATIN AB SERPL-ACNC: <0.2 AI
DSDNA AB SER-ACNC: <1 IU/ML
ENA JO1 AB SER QL IA: <0.2 AI
ENA RNP AB SER IA-ACNC: 0.2 AI
ENA SCL70 AB SER QL IA: <0.2 AI
ENA SM AB SER IA-ACNC: <0.2 AI
ENA SM+RNP AB SER QL IA: <0.2 AI
ENA SS-A AB SER IA-ACNC: <0.2 AI
ENA SS-B AB SER IA-ACNC: 0.3 AI
RIBOSOMAL P AB SER-ACNC: <0.2 AI

## 2024-11-07 ENCOUNTER — APPOINTMENT (OUTPATIENT)
Dept: RHEUMATOLOGY | Facility: CLINIC | Age: 39
End: 2024-11-07
Payer: COMMERCIAL

## 2024-11-07 ENCOUNTER — TELEPHONE (OUTPATIENT)
Dept: RHEUMATOLOGY | Facility: CLINIC | Age: 39
End: 2024-11-07

## 2024-11-07 ENCOUNTER — HOSPITAL ENCOUNTER (OUTPATIENT)
Dept: RADIOLOGY | Facility: CLINIC | Age: 39
Discharge: HOME | End: 2024-11-07
Payer: COMMERCIAL

## 2024-11-07 VITALS
DIASTOLIC BLOOD PRESSURE: 71 MMHG | SYSTOLIC BLOOD PRESSURE: 108 MMHG | OXYGEN SATURATION: 98 % | HEART RATE: 88 BPM | TEMPERATURE: 97.9 F | BODY MASS INDEX: 30.23 KG/M2 | WEIGHT: 193 LBS

## 2024-11-07 DIAGNOSIS — M25.50 ARTHRALGIA, UNSPECIFIED JOINT: ICD-10-CM

## 2024-11-07 PROCEDURE — 99204 OFFICE O/P NEW MOD 45 MIN: CPT | Performed by: STUDENT IN AN ORGANIZED HEALTH CARE EDUCATION/TRAINING PROGRAM

## 2024-11-07 PROCEDURE — 73560 X-RAY EXAM OF KNEE 1 OR 2: CPT | Mod: 50

## 2024-11-07 PROCEDURE — 1036F TOBACCO NON-USER: CPT | Performed by: STUDENT IN AN ORGANIZED HEALTH CARE EDUCATION/TRAINING PROGRAM

## 2024-11-07 RX ORDER — PREDNISONE 5 MG/1
TABLET ORAL
Qty: 21 TABLET | Refills: 0 | Status: SHIPPED | OUTPATIENT
Start: 2024-11-07 | End: 2024-11-21

## 2024-11-07 RX ORDER — TIRZEPATIDE 5 MG/.5ML
INJECTION, SOLUTION SUBCUTANEOUS
COMMUNITY
Start: 2024-11-05

## 2024-11-07 ASSESSMENT — PAIN SCALES - GENERAL: PAINLEVEL_OUTOF10: 4

## 2024-11-07 NOTE — PROGRESS NOTES
Subjective   Patient ID: Sharon Lino is a 39 y.o. female who presents for New Patient Visit.  HPI    The patient is a 39 year old female with a prior history of PARDEEP, seronegative RA here for follow up. Was last seen by rheumatology in 2021 by Dr. Espinoza, at that time was started on Xeljanz which helped significantly.     Main pain has been in the hands, knees. Notes morning stiffness lasting an hour, has noted intermittent swelling as well.   Takes Meloxicam daily which has helped.   No history of uveitis, inflammatory eye disease, fevers, shortness of breath, ILD, GI,  issues.     Prior history as noted below:  She was diagnosed with juvenile rheumatoid arthritis in 1992. When she was first diagnosed she had symptoms in her knees and was found to have a Baker's cyst. She had done Remicade twice at the age of 14 and 20. She is also been on Enbrel, Humira. She found that medications would work for a while and then decrease in efficacy. She has been on different anti-inflammatory such as Daypro and Feldene.     She has been on medication such as Plaquenil, Daypro, methotrexate, Enbrel, Humira, Remicade, Xeljanz, Feldene, sulfasalazine. She has had steroid injections. She has undergone physical therapy and also go swimming.     She is BR CA 2+ and had a preventative/prophylactic bilateral mastectomy with reconstruction.     She had seen Dr. Daniela Peres when she was younger and then she moved to [place]. When she moved back into the area she started following with Dr. Lopez.     When she was last seen by Dr. Lopez in July 2019 she was started on Xeljanz. At the time she was still on sulfasalazine, diclofenac, Plaquenil. Patient was given 2 months of samples but was inconsistent and did not follow back.    - Methotrexate : was on it for 10 years, got GI side effects  - Remicade: for 2-3 years, became ineffective  - Enbrel - 1-2 years, became ineffective  - Humira: had side effects  - Xeljanz (was on it  around 3 years ago, for 2 weeks)    Social history:  - Works as a nurse, does not smoke, drink alcohol.     Surgeries:  - Appendectomy, mastectomy and hysterectomy     Review of Systems  Constitutional: Denies fever, chills   Eyes: Denies dry eyes, blurry vision, redness of the eyes, pain in the eyes   ENT: Denies dry mouth, loss of taste, sores in the mouth, or difficulty swallowing   Cardiovascular: Denies chest pain, palpitations   Respiratory: Denies shortness of breath   Gastrointestinal: Denies changes in bowl or bladder function, nausea, vomiting, heartburn   Integumentary: Denies photosensitivity, rash or lesions, Raynaud's   Neurological: Denies any numbness or tingling   Hematologic/Lymphatic: Denies bleeding, alopecia, Raynaud's phenomena   MSK: As per HPI. Denies weakness, difficulty rising from chair, aches, problems with hand      Objective   Physical Exam  General: AAOx3. Cooperative.   Head: normocephalic, atraumatic   Eyes: EOMI, conjunctiva clear, sclera white, anicteric   Ears: hearing intact   Nose: no deformity   Throat/Mouth: No oral deformities. Mucosa appear moist. No oral ulcers noted.   Neck/Lymph: FROM. trachea midline   Lungs: chest expansion symmetric Clear to auscultation bilaterally. No wheezing, rhonchi, stridor.   Heart: S1, S2, RRR. No murmur, rub.   Abdomen: Soft, non-tender without masses   Neuro: CN II-XII grossly intact, no focal deficit   Skin: No rashes, ulcers or photosensitive areas   MSK: Upper Extremities:   Hand/Fingers: minimal synovitis 2nd 3rd mcp, otherwise normal ROM.   Wrists: no erythema, swelling, or pain at wrist, FROM grossly   Elbows: No swelling, pain, erythema on elbows, FROM grossly   Shoulders: no swelling, redness, tenderness at shoulders   Lower Extremities:   Hips: No obvious deformities. no joint tenderness, normal ROM grossly   Knees: No pain, deformities, swelling, rashes, warmth, normal ROM grossly   Ankles, feet: no deformities, swelling,  ulceration, warmth, swelling, synovitis at ankle joints, normal ROM grossly.    Assessment/Plan   Diagnoses and all orders for this visit:  Arthralgia, unspecified joint  -     Referral to Rheumatology  -     predniSONE (Deltasone) 5 mg tablet; Take 2 tablets (10 mg) by mouth once daily for 7 days, THEN 1 tablet (5 mg) once daily for 7 days.  -     XR knee 3 views bilateral; Future       The patient is a 39 year old female with a prior history of PARDEEP, seronegative RA here to reestablish care. Was last seen by rheumatology in 2021 by Dr. Espinoza, at that time was started on Xeljanz which helped significantly.     Main pain has been in the hands, knees. Notes morning stiffness lasting an hour, has noted intermittent swelling as well.   Takes Meloxicam daily which has helped.   No history of uveitis, inflammatory eye disease, fevers, shortness of breath, ILD, GI,  issues.     Prior IS:  - Methotrexate : was on it for 10 years, got GI side effects  - Remicade: for 2-3 years, became ineffective  - Enbrel - 1-2 years, became ineffective  - Humira: had side effects  - Xeljanz (was on it around 3 years ago, for 2 weeks)    Labs reviewed:  - EDUARDO 1:160, MATILDA negative  - RF, CCP Ab negative  - CRP normal  - ESR 24  - CBC, CMP normal     CDAI 16    PLAN:  - Will reinitiate Xeljanz 5 mg bid, will coordinate with pharmacy to help start Xeljanz  - Short PRD taper as mentioned above  - Advised to taper and discontinue Meloxicam as able  - Will obtain bilateral knee X-rays  - Repeat labs in 2-3 months  - Follow up in 2-3 months    Case discussed with Dr. Thaddeus Mcgarry DO 11/07/24 2:35 PM

## 2024-11-08 ENCOUNTER — SPECIALTY PHARMACY (OUTPATIENT)
Dept: PHARMACY | Facility: CLINIC | Age: 39
End: 2024-11-08

## 2024-11-08 DIAGNOSIS — M06.9: Primary | ICD-10-CM

## 2024-11-08 NOTE — PROGRESS NOTES
Patient seen for follow up, per provider, restarting Xeljanz tabs once daily. Updated rx sent through to [facility] for PA approval/dispensing. Will reroute if insurance requires.

## 2024-11-11 ENCOUNTER — TELEPHONE (OUTPATIENT)
Dept: RHEUMATOLOGY | Facility: CLINIC | Age: 39
End: 2024-11-11
Payer: COMMERCIAL

## 2024-11-11 DIAGNOSIS — M25.50 ARTHRALGIA, UNSPECIFIED JOINT: Primary | ICD-10-CM

## 2024-11-11 NOTE — TELEPHONE ENCOUNTER
----- Message from Loni Mcghee sent at 11/11/2024 11:50 AM EST -----  Regarding: FW: TB/Hep B labs needed?  Hi!  Would you like updated HepB/TB labs done on this pt? Her most recent results are from 2018.  Thanks!  ----- Message -----  From: Aditi Ferraro, PharmD  Sent: 11/8/2024   4:13 PM EST  To: [facility] Rheumatology Team  Subject: TB/Hep B labs needed?                            Hello,    For this patient, we received a new script for Xeljanz. However, it looks like she has not completed TB or Hep B labs since 2018. Would the provider like these ordered and completed before she restarts therapy? Please let me know and thank you in advance!    -Adiit

## 2024-11-13 ENCOUNTER — PHARMACY VISIT (OUTPATIENT)
Dept: PHARMACY | Facility: CLINIC | Age: 39
End: 2024-11-13
Payer: COMMERCIAL

## 2024-11-13 ENCOUNTER — SPECIALTY PHARMACY (OUTPATIENT)
Dept: PHARMACY | Facility: CLINIC | Age: 39
End: 2024-11-13

## 2024-11-13 PROCEDURE — RXMED WILLOW AMBULATORY MEDICATION CHARGE

## 2024-11-14 ENCOUNTER — LAB (OUTPATIENT)
Dept: LAB | Facility: LAB | Age: 39
End: 2024-11-14
Payer: COMMERCIAL

## 2024-11-14 DIAGNOSIS — M25.50 ARTHRALGIA, UNSPECIFIED JOINT: ICD-10-CM

## 2024-11-14 PROCEDURE — 86803 HEPATITIS C AB TEST: CPT

## 2024-11-14 PROCEDURE — 86705 HEP B CORE ANTIBODY IGM: CPT

## 2024-11-14 PROCEDURE — 87340 HEPATITIS B SURFACE AG IA: CPT

## 2024-11-14 PROCEDURE — 36415 COLL VENOUS BLD VENIPUNCTURE: CPT

## 2024-11-14 PROCEDURE — 86481 TB AG RESPONSE T-CELL SUSP: CPT

## 2024-11-14 PROCEDURE — 86706 HEP B SURFACE ANTIBODY: CPT

## 2024-11-15 LAB
HBV CORE IGM SER QL: NONREACTIVE
HBV SURFACE AB SER-ACNC: <3.1 MIU/ML
HBV SURFACE AG SERPL QL IA: NONREACTIVE
HCV AB SER QL: NONREACTIVE

## 2024-11-16 LAB
NIL(NEG) CONTROL SPOT COUNT: NORMAL
PANEL A SPOT COUNT: 0
PANEL B SPOT COUNT: 0
POS CONTROL SPOT COUNT: NORMAL
T-SPOT. TB INTERPRETATION: NEGATIVE

## 2024-11-22 ENCOUNTER — TELEMEDICINE CLINICAL SUPPORT (OUTPATIENT)
Dept: PHARMACY | Facility: HOSPITAL | Age: 39
End: 2024-11-22
Payer: COMMERCIAL

## 2024-11-22 NOTE — PROGRESS NOTES
[facility] Clinical Note  Initial Patient Education     Introduction  Sharon Lino is a 39 y.o. female who is on the specialty pharmacy service for management of: Rheumatology Core.    Sharon Lino is initiating the following therapy: Xeljanz XR 11 mg daily     Medication receipt date: 11/22/24  Duration of therapy: Maintenance    The most recent encounter visit with the referring prescriber Dr. Brian Travis and Dr. Rosalind Mcgarry on 11/7/24 was reviewed.  Pharmacy will continue to collaborate in the care of this patient with the referring prescriber.    Clinical Background  An initial assessment was conducted prior to first fill of the medication to determine the appropriateness of therapy given the patient's diagnosis, medication list, comorbidities, allergies, medical history, patient's ability to self administer medication, and therapeutic goals based on possible outcomes of therapy. Refer to initial assessment task completed on 11/18/24.    Labs for clinical appropriateness that were reviewed include:   Rheumatology- CBC-diff:   Lab Results   Component Value Date    WBC 6.1 10/23/2024    RBC 4.88 10/23/2024    HGB 13.7 10/23/2024    HCT 41.6 10/23/2024    MCV 85 10/23/2024    MCHC 32.9 10/23/2024     10/23/2024    RDW 13.2 10/23/2024    NEUTOPHILPCT 50.8 09/27/2021    IGPCT 0.3 09/27/2021    LYMPHOPCT 36.2 09/27/2021    MONOPCT 6.6 09/27/2021    EOSPCT 5.3 09/27/2021    BASOPCT 0.8 09/27/2021    NEUTROABS 4.39 09/27/2021    LYMPHSABS 3.13 09/27/2021    MONOSABS 0.57 09/27/2021    EOSABS 0.46 09/27/2021    BASOSABS 0.07 09/27/2021   , CMP:   Lab Results   Component Value Date    GLUCOSE 78 10/23/2024     10/23/2024    K 4.0 10/23/2024     10/23/2024    CO2 24 10/23/2024    ANIONGAP 14 10/23/2024    BUN 13 10/23/2024    CREATININE 0.57 10/23/2024    GFRF >90 12/08/2022    CALCIUM 9.0 10/23/2024    ALBUMIN 4.2 10/23/2024    ALKPHOS 68 10/23/2024    PROT  "7.4 10/23/2024    AST 11 10/23/2024    BILITOT 0.4 10/23/2024    ALT 12 10/23/2024   , TB:   Lab Results   Component Value Date    TBSIN Negative 11/14/2024    QFG NEGATIVE 10/16/2018   , Hepatitis B panel:   Lab Results   Component Value Date    HEPBCIGM Nonreactive 11/14/2024    HEPBSAG Nonreactive 11/14/2024    HEPBSAB <3.1 11/14/2024   , Hepatitis C antibody:   Lab Results   Component Value Date    HEPCAB Nonreactive 11/14/2024   , LFTs and bilirubin:   Lab Results   Component Value Date    ALT 12 10/23/2024    AST 11 10/23/2024    ALKPHOS 68 10/23/2024    BILITOT 0.4 10/23/2024   , CRP:   Lab Results   Component Value Date    CRP 0.90 10/23/2024     , ESR:    Lab Results   Component Value Date    SEDRATE 24 (H) 10/23/2024   , and Rheumatoid factor: No components found for: \"CK\", \"CPK\"    Education/Discussion  Sharon was contacted on 11/22/2024 at 2:55 PM for a pharmacy visit with encounter number [id number] from:   [facility]  [address] [place] [id number]  Dept: [phone]  Dept Fax: [phone]  Loc: [phone]  Sharon consented to a/an Telephone visit, which was performed.    Medication Start Date (planned or actual): 11/22/24  Education was conducted prior to start of therapy? Yes    Education discussed includes the following:  Patient Education  Counseled the Patient on the Following : Theraputic rationale and expected outcomes, Expected duration of therapy, Doses and administration, Adherence and missed doses, Possible side effects and management, Possible drug interactions, Safe handling, storage, and disposal, Contraindications and precautions, Pharmacy contact information  Learner: Patient  Education Method: Explanation  Education Response: Verbalizes understanding  Additional details of the medication specific counseling are found within the linked patient education flowsheet.     The follow up timeline was discussed. " Every person responds to and reacts to therapy differently. Patient should be assessed for efficacy and tolerability in approximately: 8-12 weeks    Provided education on goals and possible outcomes of therapy:  Adherence with therapy  Timely completion of appropriate labs  Timely and appropriate follow up with provider  Identify and address medication interactions with presciption medications, OTC medications and supplements  Optimize or maintain quality of life  Rheumatology: Remission or low disease activity   Reduction of inflammation, joint pain, swelling, and morning stiffness    The importance of adherence was discussed and they were advised to take the medication as prescribed by their provider.     Impression/Plan  Review and Assessment   Reviewed During This Encounter: Medications  Medications Assessed for Appropriate Use, Dose, Route, Frequency, and Duration: Yes  Medication Reconciliation Completed: Yes  Drug Interactions Evaluated: Yes  Clinically Relevant Drug Interactions Identified: No    This patient has not been identified as high risk due to Lack of high risk qualifiers.  The following action was taken: N/A.    QOL/Patient Satisfaction  Rate your quality of life on scale of 1-10: 8  Rate your satisfaction with  Specialty Pharmacy on scale of 1-10: 10 - Completely satisfied    Provided contact information ([phone]) for [facility] and reviewed dispensing process, refill timeline and patient management follow up. Advised to contact the pharmacy if there are any adverse effects and/or changes to medication list, including prescriptions, OTC medications, herbal products, or supplements. Confirmed understanding of education conducted during assessment. All questions and concerns were addressed and patient was encouraged to reach out for additional questions or concerns.    Yuridia Caba, CandiD

## 2024-12-10 ENCOUNTER — SPECIALTY PHARMACY (OUTPATIENT)
Dept: PHARMACY | Facility: CLINIC | Age: 39
End: 2024-12-10

## 2024-12-10 ENCOUNTER — PHARMACY VISIT (OUTPATIENT)
Dept: PHARMACY | Facility: CLINIC | Age: 39
End: 2024-12-10
Payer: COMMERCIAL

## 2024-12-10 PROCEDURE — RXMED WILLOW AMBULATORY MEDICATION CHARGE

## 2025-01-14 PROCEDURE — RXMED WILLOW AMBULATORY MEDICATION CHARGE

## 2025-01-21 ENCOUNTER — SPECIALTY PHARMACY (OUTPATIENT)
Dept: PHARMACY | Facility: CLINIC | Age: 40
End: 2025-01-21

## 2025-01-22 ENCOUNTER — PHARMACY VISIT (OUTPATIENT)
Dept: PHARMACY | Facility: CLINIC | Age: 40
End: 2025-01-22
Payer: COMMERCIAL

## 2025-01-29 ENCOUNTER — SPECIALTY PHARMACY (OUTPATIENT)
Dept: PHARMACY | Facility: CLINIC | Age: 40
End: 2025-01-29

## 2025-02-06 DIAGNOSIS — F32.A ANXIETY AND DEPRESSION: ICD-10-CM

## 2025-02-06 DIAGNOSIS — F41.9 ANXIETY AND DEPRESSION: ICD-10-CM

## 2025-02-06 RX ORDER — CITALOPRAM 10 MG/1
10 TABLET ORAL DAILY
Qty: 90 TABLET | Refills: 3 | Status: SHIPPED | OUTPATIENT
Start: 2025-02-06 | End: 2026-02-06

## 2025-02-13 ENCOUNTER — APPOINTMENT (OUTPATIENT)
Dept: RHEUMATOLOGY | Facility: CLINIC | Age: 40
End: 2025-02-13
Payer: COMMERCIAL

## 2025-02-13 VITALS
BODY MASS INDEX: 29.91 KG/M2 | WEIGHT: 191 LBS | OXYGEN SATURATION: 96 % | SYSTOLIC BLOOD PRESSURE: 110 MMHG | DIASTOLIC BLOOD PRESSURE: 64 MMHG | HEART RATE: 85 BPM | TEMPERATURE: 97.9 F

## 2025-02-13 DIAGNOSIS — G89.29 CHRONIC BILATERAL BACK PAIN, UNSPECIFIED BACK LOCATION: Primary | ICD-10-CM

## 2025-02-13 DIAGNOSIS — M06.9: ICD-10-CM

## 2025-02-13 DIAGNOSIS — M54.9 CHRONIC BILATERAL BACK PAIN, UNSPECIFIED BACK LOCATION: Primary | ICD-10-CM

## 2025-02-13 PROCEDURE — 99214 OFFICE O/P EST MOD 30 MIN: CPT | Performed by: STUDENT IN AN ORGANIZED HEALTH CARE EDUCATION/TRAINING PROGRAM

## 2025-02-13 PROCEDURE — 1036F TOBACCO NON-USER: CPT | Performed by: STUDENT IN AN ORGANIZED HEALTH CARE EDUCATION/TRAINING PROGRAM

## 2025-02-13 ASSESSMENT — PAIN SCALES - GENERAL: PAINLEVEL_OUTOF10: 0-NO PAIN

## 2025-02-13 NOTE — PROGRESS NOTES
Subjective   Patient ID: Sharon Lino is a 39 y.o. female who presents for Follow-up.  HPI  The patient is a 39 year old female with a prior history of PARDEEP, seronegative RA here for follow up. Was last seen by rheumatology in 2021 by Dr. Espinoza, at that time was started on Xeljanz which helped significantly.     She was started on Xeljanz at last visit in November 2024.     Interval history:  - overall has noted significant improvement in her hand pain, stiffness since starting Xeljanz. No recent swelling.   Main pain has been in the hands, knees. Notes morning stiffness lasting 20 min  Takes Meloxicam PRN which has helped.   Notes pain in mid back and left hip that is worse at the end of the day, worse with walking and activity. No pain or stiffness there in the morning.   No history of uveitis, inflammatory eye disease, fevers, shortness of breath, ILD, GI,  issues.      Prior history as noted below:  She was diagnosed with juvenile rheumatoid arthritis in 1992. When she was first diagnosed she had symptoms in her knees and was found to have a Baker's cyst. She had done Remicade twice at the age of 14 and 20. She is also been on Enbrel, Humira. She found that medications would work for a while and then decrease in efficacy. She has been on different anti-inflammatory such as Daypro and Feldene.     She has been on medication such as Plaquenil, Daypro, methotrexate, Enbrel, Humira, Remicade, Xeljanz, Feldene, sulfasalazine. She has had steroid injections. She has undergone physical therapy and also go swimming.     She is BR CA 2+ and had a preventative/prophylactic bilateral mastectomy with reconstruction.     She had seen Dr. Daniela Peres when she was younger and then she moved to [place]. When she moved back into the area she started following with Dr. Lopez.     When she was last seen by Dr. Lopez in July 2019 she was started on Xeljanz. At the time she was still on sulfasalazine, diclofenac,  Plaquenil. Patient was given 2 months of samples but was inconsistent and did not follow back.     - Methotrexate : was on it for 10 years, got GI side effects  - Remicade: for 2-3 years, became ineffective  - Enbrel - 1-2 years, became ineffective  - Humira: had side effects  - Xeljanz (was on it around 3 years ago, for 2 weeks)     Social history:  - Works as a nurse, does not smoke, drink alcohol.      Surgeries:  - Appendectomy, mastectomy and hysterectomy     Review of Systems  Constitutional: Denies fever, chills   Eyes: Denies dry eyes, blurry vision, redness of the eyes, pain in the eyes   ENT: Denies dry mouth, loss of taste, sores in the mouth, or difficulty swallowing   Cardiovascular: Denies chest pain, palpitations   Respiratory: Denies shortness of breath   Gastrointestinal: Denies changes in bowl or bladder function, nausea, vomiting, heartburn   Integumentary: Denies photosensitivity, rash or lesions, Raynaud's   Neurological: Denies any numbness or tingling   Hematologic/Lymphatic: Denies bleeding, alopecia, Raynaud's phenomena   MSK: As per HPI. Denies weakness, difficulty rising from chair, aches, problems with hand      Objective   Physical Exam  General: AAOx3. Cooperative.   Head: normocephalic, atraumatic   Eyes: EOMI, conjunctiva clear, sclera white, anicteric   Ears: hearing intact   Nose: no deformity   Throat/Mouth: No oral deformities. Mucosa appear moist. No oral ulcers noted.   Neck/Lymph: FROM. trachea midline   Lungs: chest expansion symmetric Clear to auscultation bilaterally. No wheezing, rhonchi, stridor.   Heart: S1, S2, RRR. No murmur, rub.   Abdomen: Soft, non-tender without masses   Neuro: CN II-XII grossly intact, no focal deficit   Skin: No rashes, ulcers or photosensitive areas   MSK: Upper Extremities:   Hand/Fingers: minimal synovitis 2nd 3rd mcp, otherwise normal ROM.   Wrists: no erythema, swelling, or pain at wrist, FROM grossly   Elbows: No swelling, pain, erythema  on elbows, FROM grossly   Shoulders: no swelling, redness, tenderness at shoulders   Lower Extremities:   Hips: No obvious deformities. no joint tenderness, normal ROM grossly   Knees: No pain, deformities, swelling, rashes, warmth, normal ROM grossly   Ankles, feet: no deformities, swelling, ulceration, warmth, swelling, synovitis at ankle joints, normal ROM grossly.     Assessment/Plan   Diagnoses and all orders for this visit:  Chronic bilateral back pain, unspecified back location  -     Referral to Physical Therapy; Future  Rheumatoid arthritis involving left hip, unspecified whether rheumatoid factor present (Multi)  -     CBC and Auto Differential; Future  -     C-Reactive Protein; Future  -     Comprehensive Metabolic Panel; Future  -     tofacitinib ER (Xeljanz XR) 11 mg tablet extended release 24 hr; Take 1 tablet (11 mg) by mouth once daily. Do not crush, chew or split. Swallow whole.       The patient is a 39 year old female with a prior history of PARDEEP, seronegative RA here to reestablish care. Was last seen by rheumatology in 2021 by Dr. Espinoza, at that time was started on Xeljanz which helped significantly.      Main pain has been in the hands, knees. Notes morning stiffness lasting an hour, has noted intermittent swelling as well.   Takes Meloxicam daily which has helped.   No history of uveitis, inflammatory eye disease, fevers, shortness of breath, ILD, GI,  issues.     - Has been on Xeljanz since last visit in November 2024. Significant improvement since starting that.      Prior IS:  - Methotrexate : was on it for 10 years, got GI side effects  - Remicade: for 2-3 years, became ineffective  - Enbrel - 1-2 years, became ineffective  - Humira: had side effects  - Xeljanz (was on it around 3 years ago, for 2 weeks)     Labs reviewed:  - EDUARDO 1:160, MATILDA negative  - RF, CCP Ab negative  - CRP normal  - ESR 24  - CBC, CMP normal      CDAI 4, Satisfactory control      PLAN:  - Continue Xeljanz daily,  med refilled   - Repeat surveillance labs today   - UTD on vaccinations, prior history of hysterectomy (no need for birth control)  - Regarding back and left hip pain: pain is mechanical in nature: will trial heat pads, lidocaine patch, stretching, PT and Voltaren gel. PT ordered. Can obtain X-rays if no relief from conservative measures.   - Follow up in 3-4 months     Rosalind Mcgarry DO 02/13/25 1:17 PM

## 2025-02-13 NOTE — PROGRESS NOTES
Attending at bed side.      I saw and evaluated the patient. I personally obtained the key and critical portions of the history and physical exam or was physically present for key and critical portions performed by the resident/fellow. I reviewed the resident/fellow's documentation and discussed the patient with the resident/fellow. I agree with the resident/fellow's medical decision making as documented in the note.    Melodie Chaney MD

## 2025-02-14 PROCEDURE — RXMED WILLOW AMBULATORY MEDICATION CHARGE

## 2025-02-20 ENCOUNTER — SPECIALTY PHARMACY (OUTPATIENT)
Dept: PHARMACY | Facility: CLINIC | Age: 40
End: 2025-02-20

## 2025-03-05 ENCOUNTER — PHARMACY VISIT (OUTPATIENT)
Dept: PHARMACY | Facility: CLINIC | Age: 40
End: 2025-03-05
Payer: COMMERCIAL

## 2025-03-18 LAB
ALBUMIN SERPL-MCNC: 4.3 G/DL (ref 3.6–5.1)
ALP SERPL-CCNC: 60 U/L (ref 31–125)
ALT SERPL-CCNC: 12 U/L (ref 6–29)
ANION GAP SERPL CALCULATED.4IONS-SCNC: 13 MMOL/L (CALC) (ref 7–17)
AST SERPL-CCNC: 12 U/L (ref 10–30)
BASOPHILS # BLD AUTO: 70 CELLS/UL (ref 0–200)
BASOPHILS NFR BLD AUTO: 0.9 %
BILIRUB SERPL-MCNC: 0.3 MG/DL (ref 0.2–1.2)
BUN SERPL-MCNC: 9 MG/DL (ref 7–25)
CALCIUM SERPL-MCNC: 9.1 MG/DL (ref 8.6–10.2)
CHLORIDE SERPL-SCNC: 105 MMOL/L (ref 98–110)
CO2 SERPL-SCNC: 20 MMOL/L (ref 20–32)
CREAT SERPL-MCNC: 0.57 MG/DL (ref 0.5–0.97)
CRP SERPL-MCNC: <3 MG/L
EGFRCR SERPLBLD CKD-EPI 2021: 119 ML/MIN/1.73M2
EOSINOPHIL # BLD AUTO: 148 CELLS/UL (ref 15–500)
EOSINOPHIL NFR BLD AUTO: 1.9 %
ERYTHROCYTE [DISTWIDTH] IN BLOOD BY AUTOMATED COUNT: 13.9 % (ref 11–15)
GLUCOSE SERPL-MCNC: 81 MG/DL (ref 65–99)
HCT VFR BLD AUTO: 37.9 % (ref 35–45)
HGB BLD-MCNC: 12.7 G/DL (ref 11.7–15.5)
LYMPHOCYTES # BLD AUTO: 2519 CELLS/UL (ref 850–3900)
LYMPHOCYTES NFR BLD AUTO: 32.3 %
MCH RBC QN AUTO: 28.8 PG (ref 27–33)
MCHC RBC AUTO-ENTMCNC: 33.5 G/DL (ref 32–36)
MCV RBC AUTO: 85.9 FL (ref 80–100)
MONOCYTES # BLD AUTO: 491 CELLS/UL (ref 200–950)
MONOCYTES NFR BLD AUTO: 6.3 %
NEUTROPHILS # BLD AUTO: 4571 CELLS/UL (ref 1500–7800)
NEUTROPHILS NFR BLD AUTO: 58.6 %
PLATELET # BLD AUTO: 412 THOUSAND/UL (ref 140–400)
PMV BLD REES-ECKER: 9.2 FL (ref 7.5–12.5)
POTASSIUM SERPL-SCNC: 4 MMOL/L (ref 3.5–5.3)
PROT SERPL-MCNC: 7.1 G/DL (ref 6.1–8.1)
RBC # BLD AUTO: 4.41 MILLION/UL (ref 3.8–5.1)
SODIUM SERPL-SCNC: 138 MMOL/L (ref 135–146)
WBC # BLD AUTO: 7.8 THOUSAND/UL (ref 3.8–10.8)

## 2025-04-09 ENCOUNTER — SPECIALTY PHARMACY (OUTPATIENT)
Dept: PHARMACY | Facility: CLINIC | Age: 40
End: 2025-04-09

## 2025-04-16 PROCEDURE — RXMED WILLOW AMBULATORY MEDICATION CHARGE

## 2025-04-19 ENCOUNTER — PHARMACY VISIT (OUTPATIENT)
Dept: PHARMACY | Facility: CLINIC | Age: 40
End: 2025-04-19
Payer: COMMERCIAL

## 2025-04-23 DIAGNOSIS — M06.9 RHEUMATOID ARTHRITIS, INVOLVING UNSPECIFIED SITE, UNSPECIFIED WHETHER RHEUMATOID FACTOR PRESENT (MULTI): Primary | ICD-10-CM

## 2025-04-23 RX ORDER — METHYLPREDNISOLONE 4 MG/1
TABLET ORAL
Qty: 21 TABLET | Refills: 0 | Status: SHIPPED | OUTPATIENT
Start: 2025-04-23 | End: 2025-04-29

## 2025-05-04 ENCOUNTER — OFFICE VISIT (OUTPATIENT)
Dept: URGENT CARE | Age: 40
End: 2025-05-04
Payer: COMMERCIAL

## 2025-05-04 VITALS
SYSTOLIC BLOOD PRESSURE: 113 MMHG | TEMPERATURE: 98.1 F | HEART RATE: 77 BPM | RESPIRATION RATE: 16 BRPM | DIASTOLIC BLOOD PRESSURE: 78 MMHG | OXYGEN SATURATION: 98 %

## 2025-05-04 DIAGNOSIS — H00.12 CHALAZION OF RIGHT LOWER EYELID: Primary | ICD-10-CM

## 2025-05-04 DIAGNOSIS — L24.9 IRRITANT CONTACT DERMATITIS, UNSPECIFIED TRIGGER: ICD-10-CM

## 2025-05-04 PROCEDURE — 99070 SPECIAL SUPPLIES PHYS/QHP: CPT | Performed by: NURSE PRACTITIONER

## 2025-05-04 PROCEDURE — 99213 OFFICE O/P EST LOW 20 MIN: CPT | Performed by: NURSE PRACTITIONER

## 2025-05-04 RX ORDER — ERYTHROMYCIN 5 MG/G
OINTMENT OPHTHALMIC 3 TIMES DAILY
Qty: 3.5 G | Refills: 0 | Status: SHIPPED | OUTPATIENT
Start: 2025-05-04 | End: 2025-05-14

## 2025-05-04 RX ORDER — CETIRIZINE HYDROCHLORIDE 10 MG/1
10 TABLET ORAL DAILY
Qty: 30 TABLET | Refills: 0 | Status: SHIPPED | OUTPATIENT
Start: 2025-05-04 | End: 2025-06-03

## 2025-05-04 ASSESSMENT — ENCOUNTER SYMPTOMS
PSYCHIATRIC NEGATIVE: 1
EYES NEGATIVE: 1
CONSTITUTIONAL NEGATIVE: 1
RESPIRATORY NEGATIVE: 1
WOUND: 1
NEUROLOGICAL NEGATIVE: 1
MUSCULOSKELETAL NEGATIVE: 1
CARDIOVASCULAR NEGATIVE: 1

## 2025-05-04 NOTE — PROGRESS NOTES
Subjective   Patient ID: Sharon Lino is a 39 y.o. female. They present today with a chief complaint of stye right eye (Denies vision change).      Past Medical History  Allergies as of 05/04/2025    (No Known Allergies)       Prescriptions Prior to Admission[1]     Medical History[2]    Surgical History[3]     reports that she quit smoking about 10 years ago. Her smoking use included cigarettes. She has never used smokeless tobacco. She reports current alcohol use. She reports that she does not use drugs.    Review of Systems  Review of Systems   Constitutional: Negative.    HENT: Negative.     Eyes: Negative.    Respiratory: Negative.     Cardiovascular: Negative.    Musculoskeletal: Negative.    Skin:  Positive for wound.   Neurological: Negative.    Psychiatric/Behavioral: Negative.                                    Objective    Vitals:    05/04/25 1159   BP: 113/78   Pulse: 77   Resp: 16   Temp: 36.7 °C (98.1 °F)   SpO2: 98%     Patient's last menstrual period was 12/07/2022.    Physical Exam  Constitutional:       Appearance: Normal appearance.   Eyes:      General:         Right eye: Hordeolum present. No discharge.         Left eye: Left eye hordeolum: vs chalazion.     Conjunctiva/sclera:      Right eye: Right conjunctiva is not injected. No chemosis or exudate.  Cardiovascular:      Rate and Rhythm: Normal rate.      Pulses: Normal pulses.   Pulmonary:      Effort: Pulmonary effort is normal.   Neurological:      Mental Status: She is alert.         Procedures    Point of Care Test & Imaging Results from this visit  No results found for this visit on 05/04/25.   Imaging  No results found.    Cardiology, Vascular, and Other Imaging  No other imaging results found for the past 2 days      Diagnostic study results (if any) were reviewed by SERA Riggs.    Assessment/Plan   Allergies, medications, history, and pertinent labs/EKGs/Imaging reviewed by SERA Riggs.     Medical  Decision Making  You have infection of eyelid  Use warm washcloth (water as warm as you can comfortably tolerate and refresh frequently to keep warm) apply this to the eyelid for 10-15 minutes at least 4-5 times daily until this has resolved.  In addition, antibiotic drops are recommended and provided. Please use these as prescribed for the next 5 days  If no improvement in 4-5 days please follow-up with ophthalmologist.  If any change in vision, increased pain, discharge or bleeding from I please go immediately to nearest emergency room for further evaluation of this problem.  Skin under right eye look irritated d/t contact dermatitis-take cetirizine, avoid eye makeup, other make up, use gentle cleanser or just water to wash face  Follow up with PCP if not improving    Orders and Diagnoses  Diagnoses and all orders for this visit:  Chalazion of right lower eyelid  -     erythromycin (Romycin) 5 mg/gram (0.5 %) ophthalmic ointment; Apply to affected eye(s) 3 times a day for 7 days. Apply Amount per Dose: 0.5 inch (~1 cm) per dose.  Irritant contact dermatitis, unspecified trigger  -     cetirizine (ZyrTEC) 10 mg tablet; Take 1 tablet (10 mg) by mouth once daily.      Medical Admin Record      Patient disposition: Home    Electronically signed by SERA Riggs  5:16 PM           [1] (Not in a hospital admission)  [2]   Past Medical History:  Diagnosis Date    Depression     Dysfunctional uterine bleeding     Dysphagia     Esophageal dysphagia    Fetal multicystic dysplastic kidney affect care of mother, antepartum ([facility]) 04/27/2023    Fibroid     Genetic susceptibility to malignant neoplasm of breast     BRCA2 positive, s/p b/l Mastectomy    GERD (gastroesophageal reflux disease)     HELLP syndrome     2017    History of blood transfusion     2017 after giving birth    History of endometriosis     Other acute postprocedural pain 08/04/2020    Post-op pain    PONV (postoperative nausea and vomiting)      Rheumatoid arthritis     Rheumatoid arthritis     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

## 2025-05-04 NOTE — PATIENT INSTRUCTIONS
You have infection of eyelid  Use warm washcloth (water as warm as you can comfortably tolerate and refresh frequently to keep warm) apply this to the eyelid for 10-15 minutes at least 4-5 times daily until this has resolved.  In addition, antibiotic drops are recommended and provided. Please use these as prescribed for the next 5 days  If no improvement in 4-5 days please follow-up with ophthalmologist.  If any change in vision, increased pain, discharge or bleeding from I please go immediately to nearest emergency room for further evaluation of this problem.  Skin under right eye look irritated d/t contact dermatitis-take cetirizine, avoid eye makeup, other make up, use gentle cleanser or just water to wash face  Follow up with PCP if not improving

## 2025-05-13 ENCOUNTER — SPECIALTY PHARMACY (OUTPATIENT)
Dept: PHARMACY | Facility: CLINIC | Age: 40
End: 2025-05-13

## 2025-05-13 ENCOUNTER — EVALUATION (OUTPATIENT)
Dept: PHYSICAL THERAPY | Facility: CLINIC | Age: 40
End: 2025-05-13
Payer: COMMERCIAL

## 2025-05-13 DIAGNOSIS — G89.29 CHRONIC BILATERAL BACK PAIN, UNSPECIFIED BACK LOCATION: ICD-10-CM

## 2025-05-13 DIAGNOSIS — M54.9 CHRONIC BILATERAL BACK PAIN, UNSPECIFIED BACK LOCATION: ICD-10-CM

## 2025-05-13 PROCEDURE — 97161 PT EVAL LOW COMPLEX 20 MIN: CPT | Mod: GP | Performed by: PHYSICAL THERAPIST

## 2025-05-13 ASSESSMENT — PATIENT HEALTH QUESTIONNAIRE - PHQ9
SUM OF ALL RESPONSES TO PHQ9 QUESTIONS 1 AND 2: 0
2. FEELING DOWN, DEPRESSED OR HOPELESS: NOT AT ALL
1. LITTLE INTEREST OR PLEASURE IN DOING THINGS: NOT AT ALL

## 2025-05-13 NOTE — Clinical Note
May 13, 2025    Rosalind Mcgarry DO  [address] [facility] [address]    Patient: Sharon Lino   YOB: 1985   Date of Visit: 5/13/2025       Dear Rosalind Mcgarry DO  [address] [place] [address]    The attached plan of care is being sent to you because your patient’s medical reimbursement requires that you certify the plan of care. Your signature is required to allow uninterrupted insurance coverage.      You may indicate your approval by signing below and faxing this form back to us at Dept Fax: [phone].    Please call Dept: [phone] with any questions or concerns.    Thank you for this referral,        Kaela Palacios, PT  [facility] [address] [facility]  [address] [facility] [address]    Payer: Payor: [employer] [place] / Plan: [employer] / Product Type: *No Product type* /                                                                         Date:     Dear Kaela Palacios, PT,     Re: Ms. Sharon Lino, MRN:[id number]    I certify that I have reviewed the attached plan of care and it is medically necessary for Ms. Sharon Lino (1985) who is under my care.          ______________________________________                    _________________  Provider name and credentials                                           Date and time                                                                                           Plan of Care 5/13/25   Effective from: 5/13/2025  Effective to: 5/19/2025    Plan ID: [id number]            Participants as of Finalize on 5/13/2025    Name Type Comments Contact Info    Rosalind Mcgarry DO Referring Provider  [phone]    Kaela Palacios, PT Physical Therapist  [phone]       Last Plan Note     Author: Kaela Palacios PT Status: Incomplete Last edited: 5/13/2025  8:00 AM       Physical Therapy    Physical Therapy Evaluation    Patient Name: Sharon Lino  MRN:  [id number]  Today's Date: 5/13/2025    Time Entry:  Time Calculation  Start Time: 0804  Stop Time: 0845  Time Calculation (min): 41 min  PT Evaluation Time Entry  PT Evaluation (Low) Time Entry: 41                      Assessment: The patient is a 39 y.o. female with hx of Juvenile arthritis. Her chief complaint is mid back pain.  She presents with faulty postural alignment, mild limitations with T-L spine ROM, tightness and tenderness in lumbar paraspinal muscles, decreased hip strength and decreased scapular strength. Pain and these impairments negatively impact her sleep quality and ability to sit and perform work duties and recreational activities. She will likely benefit from PT to reduce pain, address these impairments and improve quality of life.        Plan Scapular and posterior chain strengthening, pectoralis stretching  Treatment/Interventions: Cryotherapy, Education/ Instruction, Hot pack, Manual therapy, Neuromuscular re-education, Self care/ home management, Taping techniques, Therapeutic activities, Therapeutic exercises  PT Plan: Skilled PT  PT Frequency: 1 time per week  Duration: 4-6 weeks  Onset Date: 02/13/25  Number of Treatments Authorized: No Authorization Required  Rehab Potential: Good  Plan of Care Agreement: Patient    Current Problem  1. Chronic bilateral back pain, unspecified back location  Referral to Physical Therapy    Follow Up In Physical Therapy          SubjectivePat has had mid back pain for  along time, but now it is more consistent, Tylenol and Ibuprofen help, but pain returns after it wears off.  Her Rheumatologist wanted her to do PT before imaging.  Has a hx of CHACORTA, dx at 6. Has it most in hands, shoulders, hips,  knee and ankles, some in spine.   If she overdoes is, she will feel numbness in back.  Feels better with rest and stretches.   Uses heating pad every night.  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  Is mindful of posture. Had PT as a kid, but none  "recently.  Denies falls in the last year.  Takes cardio class 1x per week and walks. Lays on right side a lot and notes that her sleep is impacted by back pain.      General:  General  Reason for Referral: M54.9,G89.29 (ICD-10-CM) - Chronic bilateral back pain, unspecified back location  Referred By: Dr. Mcgarry  Past Medical History Relevant to Rehab: Juvenile Arthritis  General Comment: Visit #1, No Auth Required  Precautions:  Precautions  STEADI Fall Risk Score (The score of 4 or more indicates an increased risk of falling): 0       Pain: 4/10 at currently.  At worst, rates pain at 8/10 after planting flowers.       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        Objective  Posture: In standing:  Shoulders and level pelvis , increased kyphosis, forward head, slight dowagers, right handed     Lumbar AROM:  Flex: mod restriction , \"I feel it in my back\"  Ext:WNL  SB R: WNL  SB L: min restriction, pain  Rot R: min restriction  Rot L: min restriction,  pain    LE/Lumbopelvic Strength:  Hip Flex: R: 5L:5  Knee Ext: R:5 L:5  Ankle DF: R:5 L:5  Bilateral Bridge: 100% no pain   Transverse Abdominis/Sahrmann Level: Good baseline contraction  Hip Abduction: R:4 L:4  Hip Extension: R:4+ L:4+  Knee Flexion: R: 5              L:5    Flexibility:  Hamstrings: R: Fair+     L: Fair-  Piriformis: R: Fair+     L:Fair-    Hip ROM:  WNL in all planes, stiffness at end range of left hip flexion, ER, and IR    UE/Scapular strength:  Shoulder Flex: 5/5  Shoulder Ext:5/5  Shoulder Abd:5/5  Elbow Flex:5/5  Elbow Ext:5/5  Lower Trap: 4+/4  Mid Trap:4-/4  Rhomboids: 4/4+          Palpation: Tightness and tenderness in B/L lumbar paraspinal muscles. Stiff P-A glides through T-spine      Special Tests: +Bruce on left     Gait: independent, no a.d.         Outcome Measures:  JOSÉ MIGUEL: 9/50 (18%)    OP " EDUCATION: Pt was educated on PT clinical findings, anatomy as it relates to symptoms and diagnosis, PT POC and initial HEP with oral and written instruction provided.    Access Code: [email or web address]  URL: [email or web address]  Date: 05/13/2025  Prepared by: Kaela Palacios    Exercises  - Supine Chest Stretch with Elbows Bent  - 1-2 x daily - 7 x weekly - 1 sets - 4 reps - 20 seconds hold  - Shoulder External Rotation and Scapular Retraction  - 1-2 x daily - 7 x weekly - 2 sets - 10 reps  - Scapular Retraction with Resistance  - 1-2 x daily - 7 x weekly - 2 sets - 10 reps  Outpatient Education  Individual(s) Educated: Patient  Education Provided: Anatomy, Body Mechanics, Home Exercise Program, POC  Patient/Caregiver Demonstrated Understanding: yes  Plan of Care Discussed and Agreed Upon: yes  Patient Response to Education: Patient/Caregiver Verbalized Understanding of Information, Patient/Caregiver Performed Return Demonstration of Exercises/Activities, Patient/Caregiver Asked Appropriate Questions    Goals:  Active       PT Problem       Patient will improved Modified Oswestry score by 13 points indicating meaningful clinical change in functional mobility.        Start:  05/13/25    Expected End:  06/24/25            Patient will be independent and adherent to HEP to enhance functional progress and long term management of condition.        Start:  05/13/25    Expected End:  06/24/25            Patient will demonstrate full and pain-free thoracic and lumbar ROM.       Start:  05/13/25    Expected End:  06/24/25            Patient will demonstrate good working knowledge of proper posture in relation to symptom management/reduction.        Start:  05/13/25    Expected End:  06/24/25            Patient will improve scapular and trunk strength to 5/5/ in order to reduce postural strain and pain levels.        Start:  05/13/25    Expected End:  06/24/25            Patient will report reduction of back  pain by 50% or greater.        Start:  05/13/25    Expected End:  06/24/25            Patient will be able to sit at work for >1 hour without restriction of back pain.       Start:  05/13/25    Expected End:  06/24/25                                Current Participants as of 5/13/2025    Name Type Comments Contact Info    Rosalind Mcgarry DO Referring Provider  [phone]    Signature pending    Kaela Palacios PT Physical Therapist  [phone]    Electronically signed by Kaela Palacios PT at 5/13/2025 1259 EDT

## 2025-05-13 NOTE — PROGRESS NOTES
Physical Therapy    Physical Therapy Evaluation    Patient Name: Sharon Lino  MRN: [id number]  Today's Date: 5/13/2025    Time Entry:  Time Calculation  Start Time: 0804  Stop Time: 0845  Time Calculation (min): 41 min  PT Evaluation Time Entry  PT Evaluation (Low) Time Entry: 41                      Assessment: The patient is a 39 y.o. female with hx of Juvenile arthritis. Her chief complaint is mid back pain.  She presents with faulty postural alignment, mild limitations with T-L spine ROM, tightness and tenderness in lumbar paraspinal muscles, decreased hip strength and decreased scapular strength. Pain and these impairments negatively impact her sleep quality and ability to sit and perform work duties and recreational activities. She will likely benefit from PT to reduce pain, address these impairments and improve quality of life.        Plan Scapular and posterior chain strengthening, pectoralis stretching  Treatment/Interventions: Cryotherapy, Education/ Instruction, Hot pack, Manual therapy, Neuromuscular re-education, Self care/ home management, Taping techniques, Therapeutic activities, Therapeutic exercises  PT Plan: Skilled PT  PT Frequency: 1 time per week  Duration: 4-6 weeks  Onset Date: 02/13/25  Number of Treatments Authorized: No Authorization Required  Rehab Potential: Good  Plan of Care Agreement: Patient    Current Problem  1. Chronic bilateral back pain, unspecified back location  Referral to Physical Therapy    Follow Up In Physical Therapy          Subjective Pat has had mid back pain for  along time, but now it is more consistent, Tylenol and Ibuprofen help, but pain returns after it wears off.  Her Rheumatologist wanted her to do PT before imaging.  Has a hx of CHACORTA, dx at 6. Has it most in hands, shoulders, hips,  knee and ankles, some in spine.   If she overdoes is, she will feel numbness in back.  Feels better with rest and stretches.   Uses heating pad every night.  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  Is mindful of posture. Had PT as a kid, but none recently.  Denies falls in the last year.  Takes cardio class 1x per week and walks. Lays on right side a lot and notes that her sleep is impacted by back pain.      General:  General  Reason for Referral: M54.9,G89.29 (ICD-10-CM) - Chronic bilateral back pain, unspecified back location  Referred By: Dr. Mcgarry  Past Medical History Relevant to Rehab: Juvenile Arthritis  General Comment: Visit #1, No Auth Required  Precautions:  Precautions  STEADI Fall Risk Score (The score of 4 or more indicates an increased risk of falling): 0       Pain: 4/10 at currently.  At worst, rates pain at 8/10 after planting flowers.       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        Objective   Posture: In standing:  Shoulders and level pelvis , increased kyphosis, forward head, slight dowagers, right handed     Lumbar AROM:  Flex: mod restriction , \"I feel it in my back\"  Ext:WNL  SB R: WNL  SB L: min restriction, pain  Rot R: min restriction  Rot L: min restriction,  pain    LE/Lumbopelvic Strength:  Hip Flex: R: 5L:5  Knee Ext: R:5 L:5  Ankle DF: R:5 L:5  Bilateral Bridge: 100% no pain   Transverse Abdominis/Sahrmann Level: Good baseline contraction  Hip Abduction: R:4 L:4  Hip Extension: R:4+ L:4+  Knee Flexion: R: 5              L:5    Flexibility:  Hamstrings: R: Fair+     L: Fair-  Piriformis: R: Fair+     L:Fair-    Hip ROM:  WNL in all planes, stiffness at end range of left hip flexion, ER, and IR    UE/Scapular strength:  Shoulder Flex: 5/5  Shoulder Ext:5/5  Shoulder Abd:5/5  Elbow Flex:5/5  Elbow Ext:5/5  Lower Trap: 4+/4  Mid Trap:4-/4  Rhomboids: 4/4+          Palpation: Tightness and tenderness in B/L lumbar paraspinal muscles. Stiff P-A glides through T-spine      Special " Tests: +Bruce on left     Gait: independent, no a.d.         Outcome Measures:  JOSÉ MIGUEL: 9/50 (18%)    OP EDUCATION: Pt was educated on PT clinical findings, anatomy as it relates to symptoms and diagnosis, PT POC and initial HEP with oral and written instruction provided.    Access Code: [email or web address]  URL: [email or web address]  Date: 05/13/2025  Prepared by: Kaela Palacios    Exercises  - Supine Chest Stretch with Elbows Bent  - 1-2 x daily - 7 x weekly - 1 sets - 4 reps - 20 seconds hold  - Shoulder External Rotation and Scapular Retraction  - 1-2 x daily - 7 x weekly - 2 sets - 10 reps  - Scapular Retraction with Resistance  - 1-2 x daily - 7 x weekly - 2 sets - 10 reps  Outpatient Education  Individual(s) Educated: Patient  Education Provided: Anatomy, Body Mechanics, Home Exercise Program, POC  Patient/Caregiver Demonstrated Understanding: yes  Plan of Care Discussed and Agreed Upon: yes  Patient Response to Education: Patient/Caregiver Verbalized Understanding of Information, Patient/Caregiver Performed Return Demonstration of Exercises/Activities, Patient/Caregiver Asked Appropriate Questions    Goals:  Active       PT Problem       Patient will improved Modified Oswestry score by 13 points indicating meaningful clinical change in functional mobility.        Start:  05/13/25    Expected End:  06/24/25            Patient will be independent and adherent to HEP to enhance functional progress and long term management of condition.        Start:  05/13/25    Expected End:  06/24/25            Patient will demonstrate full and pain-free thoracic and lumbar ROM.       Start:  05/13/25    Expected End:  06/24/25            Patient will demonstrate good working knowledge of proper posture in relation to symptom management/reduction.        Start:  05/13/25    Expected End:  06/24/25            Patient will improve scapular and trunk strength to 5/5/ in order to reduce postural strain and pain levels.         Start:  05/13/25    Expected End:  06/24/25            Patient will report reduction of back pain by 50% or greater.        Start:  05/13/25    Expected End:  06/24/25            Patient will be able to sit at work for >1 hour without restriction of back pain.       Start:  05/13/25    Expected End:  06/24/25

## 2025-05-15 ENCOUNTER — APPOINTMENT (OUTPATIENT)
Dept: RHEUMATOLOGY | Facility: CLINIC | Age: 40
End: 2025-05-15
Payer: COMMERCIAL

## 2025-05-20 ENCOUNTER — DOCUMENTATION (OUTPATIENT)
Dept: PHYSICAL THERAPY | Facility: CLINIC | Age: 40
End: 2025-05-20
Payer: COMMERCIAL

## 2025-05-20 ENCOUNTER — APPOINTMENT (OUTPATIENT)
Dept: PHYSICAL THERAPY | Facility: CLINIC | Age: 40
End: 2025-05-20
Payer: COMMERCIAL

## 2025-05-20 NOTE — PROGRESS NOTES
Physical Therapy                 Therapy Communication Note    Patient Name: Sharon Lino  MRN: [id number]  Department:   Room: Room/bed info not found  Today's Date: 5/20/2025     Discipline: Physical Therapy    Missed Visit:       Missed Visit Reason:   Patient canceled due to child being ill.    Missed Time: Cancel at 8:00    Comment:

## 2025-05-22 ENCOUNTER — TELEMEDICINE CLINICAL SUPPORT (OUTPATIENT)
Dept: PHARMACY | Facility: HOSPITAL | Age: 40
End: 2025-05-22
Payer: COMMERCIAL

## 2025-05-22 DIAGNOSIS — M06.9: ICD-10-CM

## 2025-05-22 NOTE — PROGRESS NOTES
[facility] Clinical Note  Patient Reassessment     Introduction  Sharon Lino is a 39 y.o. female who is on the specialty pharmacy service for management of: Rheumatology Core.      [email or web address] supplied medication: Xeljanz XR 11 mg by mouth once daily         Duration of therapy: Maintenance    The most recent encounter visit with the referring prescriber Rosalind Mcgarry DO (fellow) and Dr. Chaney (attending)  on 02/13/2025 was reviewed.  Pharmacy will continue to collaborate in the care of this patient with the referring prescriber.    Discussion  Sharon was contacted on 5/22/2025 at 11:06 AM for a pharmacy visit with encounter number [id number] from:   [facility]  [address]  [address]  [place] [address]  Dept: [phone]  Dept Fax: [phone]  Loc: [phone]  Sharon consented to a/an Telephone visit, which was performed.    Efficacy  Patient has developed new symptoms of condition: No  Patient/caregiver feels medication is affecting the disease state: Xeljanz is working great, very happy with impact it has had on Rheumatoid arthritis symptoms.     Goals  Provided education on goals and possible outcomes of therapy:  Adherence with therapy  Timely completion of appropriate labs  Timely and appropriate follow up with provider  Identify and address medication interactions with presciption medications, OTC medications and supplements  Optimize or maintain quality of life  Rheumatology: Remission or low disease activity  Reduction of inflammation, joint pain, swelling, and morning stiffness  Patient has documented target(s) for goals of therapy: Yes  Patient status for goal(s): On track    Targets       Target Due Completed Completed By Outcome Source     Goal:  Prevent and reduce disease flares 11/22/2025 -- -- -- --         Goal: Remission or low disease activity 11/22/2025 -- -- -- --         Goal:  Prevent and  reduce disease flares 5/22/2025 5/22/2025 Angelita Wolf, PharmD On Track --         Goal: Remission or low disease activity 5/22/2025 5/22/2025 Angelita Wolf, PharmD On Track --                Tolerance  Patient has experienced side effects from this medication: No  Changes to current therapy regimen: No    The follow-up timeline was discussed. Every person responds to and reacts to therapy differently. Patient should be assessed for efficacy and tolerability in approximately: 6 months            Adherence  Patient Information  Informant: Self (Patient)  Demonstrates Understanding of Importance of Adherence: Yes  Does the patient have any barriers to self-administration (including physical and mental?): No  Support Network for Adherence: Healthcare Provider  Adherence Tools Used: Medication list  Medication Information  Medication: tofacitinib citrate (Xeljanz XR)  Patient Reported Missed Doses in the Last 4 Weeks: 0  Estimated Medication Adherence Level: %  Adherence Estimation Source: Claims history  Barriers to Adherence: No Problems identified   The importance of adherence was discussed and patient/caregiver was advised to take the medication as prescribed by their provider. Encouraged patient/caregiver to call physician's office or specialty pharmacy if they have a question regarding a missed dose.    General Assessment  Changes to home medications, OTCs or supplements: No  Current Medications[1]  Reported new allergies: No  Reported new medical conditions: No  Additional monitoring reviewed: Rheumatology- CBC-diff:   Lab Results   Component Value Date    WBC 7.8 03/17/2025    RBC 4.41 03/17/2025    HGB 12.7 03/17/2025    HCT 37.9 03/17/2025    MCV 85.9 03/17/2025    MCHC 33.5 03/17/2025     (H) 03/17/2025    RDW 13.9 03/17/2025    NEUTOPHILPCT 50.8 09/27/2021    IGPCT 0.3 09/27/2021    LYMPHOPCT 32.3 03/17/2025    MONOPCT 6.3 03/17/2025    EOSPCT 1.9 03/17/2025    BASOPCT 0.9 03/17/2025     NEUTROABS 4.39 09/27/2021    LYMPHSABS 3.13 09/27/2021    MONOSABS 0.57 09/27/2021    EOSABS 148 03/17/2025    BASOSABS 70 03/17/2025   , CMP:   Lab Results   Component Value Date    GLUCOSE 81 03/17/2025     03/17/2025    K 4.0 03/17/2025     03/17/2025    CO2 20 03/17/2025    ANIONGAP 13 03/17/2025    BUN 9 03/17/2025    CREATININE 0.57 03/17/2025    GFRF >90 12/08/2022    CALCIUM 9.1 03/17/2025    ALBUMIN 4.3 03/17/2025    ALKPHOS 60 03/17/2025    PROT 7.1 03/17/2025    AST 12 03/17/2025    BILITOT 0.3 03/17/2025    ALT 12 03/17/2025   , TB:   Lab Results   Component Value Date    TBSIN Negative 11/14/2024    QFG NEGATIVE 10/16/2018   , Hepatitis B panel:   Lab Results   Component Value Date    HEPBCIGM Nonreactive 11/14/2024    HEPBSAG Nonreactive 11/14/2024    HEPBSAB <3.1 11/14/2024   ,  Is laboratory follow up needed? No    Advised to contact the pharmacy if there are any changes to the patient's medication list, including prescriptions, OTC medications, herbal products, or supplements.    Impression/Plan  This patient has not been identified as high risk due to Lack of high risk qualifiers.  The following action was taken:N/A          QOL/Patient Satisfaction  Rate your quality of life on scale of 1-10: 8  Rate your satisfaction with  Specialty Pharmacy on scale of 1-10: 10 - Completely satisfied    Provided contact information ([phone]) for [facility] and reviewed dispensing process, refill timeline and patient management follow up. Confirmed understanding of education conducted during assessment. All questions and concerns were addressed and patient/caregiver was encouraged to reach out for additional questions or concerns.    Based on the patient's diagnosis, medication list, progress towards goals, adherence, tolerance, and medication list, medication remains appropriate: Therapy remains appropriate (I attest)    Angelita Wolf, PharmD         [1]    Current Outpatient Medications   Medication Sig Dispense Refill    acetaminophen (Tylenol) 500 mg tablet Take 2 tablets (1,000 mg) by mouth every 6 hours if needed for mild pain (1 - 3). 30 tablet 0    ALPRAZolam (Xanax) 0.5 mg tablet Take 1 tablet (0.5 mg) by mouth 3 times a day as needed for anxiety for up to 6 days. 18 tablet 0    cetirizine (ZyrTEC) 10 mg tablet Take 1 tablet (10 mg) by mouth once daily. 30 tablet 0    citalopram (CeleXA) 10 mg tablet Take 1 tablet (10 mg) by mouth once daily. 90 tablet 3    ibuprofen 800 mg tablet Take 1 tablet (800 mg) by mouth every 6 hours if needed for mild pain (1 - 3). 30 tablet 0    Mounjaro 2.5 mg/0.5 mL pen injector INJECT 2.5MG SUBCUTANEOUSLY ONCE EVERY WEEK      Mounjaro 5 mg/0.5 mL pen injector       omeprazole OTC (PriLOSEC OTC) 20 mg EC tablet Take 1 tablet (20 mg) by mouth once daily in the morning. Take before meals. Do not crush, chew, or split.      tofacitinib ER (Xeljanz XR) 11 mg tablet extended release 24 hr Take 1 tablet (11 mg) by mouth once daily. Do not crush, chew or split. Swallow whole. 30 tablet 3     No current facility-administered medications for this visit.

## 2025-05-23 ENCOUNTER — PHARMACY VISIT (OUTPATIENT)
Dept: PHARMACY | Facility: CLINIC | Age: 40
End: 2025-05-23
Payer: COMMERCIAL

## 2025-05-23 PROCEDURE — RXMED WILLOW AMBULATORY MEDICATION CHARGE

## 2025-05-27 ENCOUNTER — APPOINTMENT (OUTPATIENT)
Dept: PHYSICAL THERAPY | Facility: CLINIC | Age: 40
End: 2025-05-27
Payer: COMMERCIAL

## 2025-05-29 ENCOUNTER — TREATMENT (OUTPATIENT)
Dept: PHYSICAL THERAPY | Facility: CLINIC | Age: 40
End: 2025-05-29
Payer: COMMERCIAL

## 2025-05-29 DIAGNOSIS — G89.29 CHRONIC BILATERAL BACK PAIN, UNSPECIFIED BACK LOCATION: ICD-10-CM

## 2025-05-29 DIAGNOSIS — M54.9 CHRONIC BILATERAL BACK PAIN, UNSPECIFIED BACK LOCATION: ICD-10-CM

## 2025-05-29 PROCEDURE — 97110 THERAPEUTIC EXERCISES: CPT | Mod: GP | Performed by: PHYSICAL THERAPIST

## 2025-05-29 NOTE — PROGRESS NOTES
Physical Therapy    Physical Therapy treatment    Patient Name: Sharon Lino  MRN: [id number]  Today's Date: 5/29/2025    Time Entry:  Time Calculation  Start Time: 1315  Stop Time: 1400  Time Calculation (min): 45 min     PT Therapeutic Procedures Time Entry  Therapeutic Exercise Time Entry: 45                 Assessment:  Patient instructed in postural exercises, UE and LE stretching exercises, strengthening exercises for shoulder/scapular muscles and hip strengthening.  Patient did well with exercises, voice no complaints.  She will likely benefit from PT to reduce pain, address these impairments and improve quality of life.  If patient had no problem with current exercises will add them to HEP next visit and send exercises via email through  [employer].     Plan:  Scapular and posterior chain strengthening, pectoralis stretching.     Current Problem  1. Chronic bilateral back pain, unspecified back location  Follow Up In Physical Therapy          Subjective  Patient reports pain is currently in her mid back rated at 4/10 pain level.    General:  Insurance - MMO, no authorization     Precautions:   Precautions - h/o Juvenile arthritis  STEADI Fall Risk Score (The score of 4 or more indicates an increased risk of falling): 0          Pain: 4/10 at currently.  At worst, rates pain at 8/10 after planting flowers.       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        Objective   Therapeutic exercises  Seated  Neck retraction 5 sec hold x 10 reps  Shoulder shrugs and shoulder depression 3 sec hold x 10 reps  Shoulder scapular retraction and protraction 3 sec hold x 10 reps     Hook lying  Chest press with SA lift using PVC bar 1 x 10 reps  B shoulder flexion with PVC bar overhead 1 x 10 reps   B shoulder horizontal abduction with Whiting theraband palms down and  palms up 1 x 10 reps each  B shoulder ER with Orange theraband 2 x 10 reps  UE PNF D2 with Orange theraband 1 x 10 reps L/R  Bridging with PPT articulating spine up and down x 10 reps  LTR with opposite head rotation stretch 5 sec hold x 10 reps  Piriformis stretch pulling knee up and over to opposite shoulder 15-20 sec hold x 3 reps L/R  Figure 4 stretch 15-20 sec hold x 3 reps L/R  B clams with blue theraband 2 x 10 reps    Standing   B Shoulder pull-downs with green theraband 1 x 10 reps  B Shoulder mid-rows with green theraband 1 x 10 reps  B shoulder LAE with green theraband 1 x 10 reps    Posture: In standing:  Shoulders and level pelvis, increased kyphosis, forward head, slight dowagers, right handed         Palpation: Tightness and tenderness in B/L lumbar paraspinal muscles. Stiff P-A glides through T-spine             OP EDUCATION: Pt was educated on PT clinical findings, anatomy as it relates to symptoms and diagnosis, PT POC and initial HEP with oral and written instruction provided.    Access Code: [email or web address]  URL: [email or web address]  Date: 05/13/2025  Prepared by: Kaela Palacios    Exercises  - Supine Chest Stretch with Elbows Bent  - 1-2 x daily - 7 x weekly - 1 sets - 4 reps - 20 seconds hold  - Shoulder External Rotation and Scapular Retraction  - 1-2 x daily - 7 x weekly - 2 sets - 10 reps  - Scapular Retraction with Resistance  - 1-2 x daily - 7 x weekly - 2 sets - 10 reps     Goals:  Active       PT Problem       Patient will improved Modified Oswestry score by 13 points indicating meaningful clinical change in functional mobility.        Start:  05/13/25    Expected End:  06/24/25            Patient will be independent and adherent to HEP to enhance functional progress and long term management of condition.        Start:  05/13/25    Expected End:  06/24/25            Patient will demonstrate full and pain-free thoracic and lumbar ROM.       Start:  05/13/25    Expected  End:  06/24/25            Patient will demonstrate good working knowledge of proper posture in relation to symptom management/reduction.        Start:  05/13/25    Expected End:  06/24/25            Patient will improve scapular and trunk strength to 5/5/ in order to reduce postural strain and pain levels.        Start:  05/13/25    Expected End:  06/24/25            Patient will report reduction of back pain by 50% or greater.        Start:  05/13/25    Expected End:  06/24/25            Patient will be able to sit at work for >1 hour without restriction of back pain.       Start:  05/13/25    Expected End:  06/24/25

## 2025-06-03 ENCOUNTER — TREATMENT (OUTPATIENT)
Dept: PHYSICAL THERAPY | Facility: CLINIC | Age: 40
End: 2025-06-03
Payer: COMMERCIAL

## 2025-06-03 DIAGNOSIS — G89.29 CHRONIC BILATERAL BACK PAIN, UNSPECIFIED BACK LOCATION: ICD-10-CM

## 2025-06-03 DIAGNOSIS — M54.9 CHRONIC BILATERAL BACK PAIN, UNSPECIFIED BACK LOCATION: ICD-10-CM

## 2025-06-03 PROCEDURE — 97110 THERAPEUTIC EXERCISES: CPT | Mod: GP

## 2025-06-03 NOTE — PROGRESS NOTES
"Physical Therapy                                                                                  Physical Therapy Treatment       Patient name: Sharon Lino  MRN:   [id number]  Today's Date: 6/3/2025  3      Time Calculation  Start Time: 1425  Stop Time: 1504  Time Calculation (min): 39 min    Assessment:  Patient demonstrated  good effort during exercises.  Patient tolerated treatment well without increased complaints of pain during or after session. She reported her back felt good following last session, so provided written instructions for today's exercises.    She will likely benefit from continued PT to reduce pain, address these impairments and improve quality of life.        Plan:   Scapular and posterior chain strengthening, pectoralis stretching.        Monitor response to treatment and adjust plan as needed.   To continue with current POC.         Current Problems:       1. Chronic bilateral back pain, unspecified back location  Follow Up In Physical Therapy                General  Reason for Referral: M54.9,G89.29 (ICD-10-CM) - Chronic bilateral back pain, unspecified back location  Referred By: Dr. Mcgarry  Past Medical History Relevant to Rehab: Juvenile Arthritis  General Comment: Visit #3 , No Auth Required    Precautions  STEADI Fall Risk Score (The score of 4 or more indicates an increased risk of falling): 0    Subjective:  Patient reported  4/10 pain at the start of session, located left lower thoracic area which wraps around ribs into the left lateral trunk area.   Patient requesting written instructions for exercises from last session.   Response to last session:   \"really helped my back\"  Performing HEP: yes    Pain  4/10 at the start of session  2/10 at the end of session    Treatment:   Therapeutic exercises  Seated  Neck retraction 5 sec hold x 10 reps  Shoulder shrugs and shoulder depression 3 sec hold x 10 reps  Shoulder scapular retraction and protraction 3 sec hold x 10 reps    " "  Hook lying  Chest press with SA lift using PVC bar 1 x 15 reps  B shoulder flexion with PVC bar overhead 1 x 15 reps   B shoulder horizontal abduction with green theraband palms down and palms up 1 x 15 reps each  B shoulder ER with green  theraband 1 x 15 reps  UE PNF D2 with  green theraband 1 x 15 reps L/R  Bridging with PPT articulating spine up and down x 15 reps  LTR with opposite head rotation stretch 5 sec hold x 15 reps  Piriformis stretch pulling knee up and over to opposite shoulder 20 sec hold x 3 reps L/R  Figure 4 stretch 20 sec hold x 3 reps L/R  B clams with green  theraband 2 x 15 reps     Standing    B Shoulder mid-rows with green theraband 2 x 15  reps  B shoulder LAE with green theraband 2 x 15  reps  B Shoulder pull-downs with green theraband 1 x 10 reps - deferred this date         Education:  Written instructions provided for updated HEP as noted below.  Patient has green theraband at home.   Access Code: [email or web address]  URL: [email or web address]  Date: 06/03/2025  Prepared by: Yuridia Lopez    Exercises  - Supine Chest Stretch with Elbows Bent  - 1-2 x daily - 7 x weekly - 1 sets - 4 reps - 20 seconds hold  - Shoulder External Rotation and Scapular Retraction  - 1-2 x daily - 7 x weekly - 2 sets - 10 reps  - Scapular Retraction with Resistance  - 1-2 x daily - 7 x weekly - 2 sets - 10 reps  - Seated Cervical Retraction  - 1-2 x daily - 7 x weekly - 2 sets - 10 reps - 3\" hold  - Seated Shoulder Shrug  - 1-2 x daily - 7 x weekly - 2 sets - 10 reps - 3\" hold  - Seated Shoulder Shrug Circles AROM Backward  - 1-2 x daily - 7 x weekly - 2 sets - 10 reps - 3\" hold  - Seated Scapular Retraction  - 1-2 x daily - 7 x weekly - 2 sets - 10 reps - 3\" hold  - Supine Scapular Protraction in Flexion with Dumbbells  - 1-2 x daily - 7 x weekly - 2 sets - 10 reps - 3\" hold  - Supine Shoulder Flexion Extension AAROM with Dowel  - 1-2 x daily - 7 x weekly - 2 sets - 10 reps - 3\" hold  - Supine " "Shoulder Horizontal Abduction with Resistance  - 1 x daily - 7 x weekly - 2 sets - 10 reps  - Supine Shoulder External Rotation with Resistance  - 1 x daily - 7 x weekly - 2 sets - 10 reps - 3\" hold  - Supine Bridge  - 1 x daily - 7 x weekly - 2 sets - 10 reps - 3\" hold  - Supine PNF D2 Flexion with Resistance  - 1 x daily - 7 x weekly - 2 sets - 10 reps - 3\" hold  - Supine LTR - 1 x daily - 7 x weekly - 2 sets - 10 reps - 3\" hold         Goals    Active       PT Problem       Patient will improved Modified Oswestry score by 13 points indicating meaningful clinical change in functional mobility.        Start:  05/13/25    Expected End:  06/24/25            Patient will be independent and adherent to HEP to enhance functional progress and long term management of condition.        Start:  05/13/25    Expected End:  06/24/25            Patient will demonstrate full and pain-free thoracic and lumbar ROM.       Start:  05/13/25    Expected End:  06/24/25            Patient will demonstrate good working knowledge of proper posture in relation to symptom management/reduction.        Start:  05/13/25    Expected End:  06/24/25            Patient will improve scapular and trunk strength to 5/5/ in order to reduce postural strain and pain levels.        Start:  05/13/25    Expected End:  06/24/25            Patient will report reduction of back pain by 50% or greater.        Start:  05/13/25    Expected End:  06/24/25            Patient will be able to sit at work for >1 hour without restriction of back pain.       Start:  05/13/25    Expected End:  06/24/25                   "

## 2025-06-12 ENCOUNTER — DOCUMENTATION (OUTPATIENT)
Dept: PHYSICAL THERAPY | Facility: CLINIC | Age: 40
End: 2025-06-12
Payer: COMMERCIAL

## 2025-06-12 DIAGNOSIS — M54.9 CHRONIC BILATERAL BACK PAIN, UNSPECIFIED BACK LOCATION: ICD-10-CM

## 2025-06-12 DIAGNOSIS — G89.29 CHRONIC BILATERAL BACK PAIN, UNSPECIFIED BACK LOCATION: ICD-10-CM

## 2025-06-12 NOTE — PROGRESS NOTES
Physical Therapy                 Therapy Communication Note    Patient Name: Sharon Lino  MRN: [id number]  Department:   Room: Room/bed info not found  Today's Date: 6/12/2025     Discipline: Physical Therapy    Missed Visit:  6/12/2025    Missed Visit Reason:  Patient forgot    Missed Time: No Show    Comment:  Called patient.

## 2025-06-17 DIAGNOSIS — M06.9: ICD-10-CM

## 2025-06-17 RX ORDER — TOFACITINIB 11 MG/1
11 TABLET, FILM COATED, EXTENDED RELEASE ORAL DAILY
Qty: 30 TABLET | Refills: 0 | Status: SHIPPED | OUTPATIENT
Start: 2025-06-17

## 2025-06-26 ENCOUNTER — APPOINTMENT (OUTPATIENT)
Dept: PHYSICAL THERAPY | Facility: CLINIC | Age: 40
End: 2025-06-26
Payer: COMMERCIAL

## 2025-06-26 DIAGNOSIS — M54.9 CHRONIC BILATERAL BACK PAIN, UNSPECIFIED BACK LOCATION: ICD-10-CM

## 2025-06-26 DIAGNOSIS — G89.29 CHRONIC BILATERAL BACK PAIN, UNSPECIFIED BACK LOCATION: ICD-10-CM

## 2025-06-28 ENCOUNTER — SPECIALTY PHARMACY (OUTPATIENT)
Dept: PHARMACY | Facility: CLINIC | Age: 40
End: 2025-06-28

## 2025-06-28 PROCEDURE — RXMED WILLOW AMBULATORY MEDICATION CHARGE

## 2025-06-30 ENCOUNTER — PHARMACY VISIT (OUTPATIENT)
Dept: PHARMACY | Facility: CLINIC | Age: 40
End: 2025-06-30
Payer: COMMERCIAL

## 2025-07-02 ENCOUNTER — TREATMENT (OUTPATIENT)
Dept: PHYSICAL THERAPY | Facility: CLINIC | Age: 40
End: 2025-07-02
Payer: COMMERCIAL

## 2025-07-02 DIAGNOSIS — G89.29 CHRONIC BILATERAL BACK PAIN, UNSPECIFIED BACK LOCATION: ICD-10-CM

## 2025-07-02 DIAGNOSIS — M54.9 CHRONIC BILATERAL BACK PAIN, UNSPECIFIED BACK LOCATION: ICD-10-CM

## 2025-07-02 PROCEDURE — 97530 THERAPEUTIC ACTIVITIES: CPT | Mod: GP | Performed by: PHYSICAL THERAPIST

## 2025-07-02 NOTE — PROGRESS NOTES
Physical Therapy    Physical Therapy Treatment    Patient Name: Sharon Lino  MRN: [id number]  Today's Date: 7/2/2025    Time Entry:                             Assessment:     Plan:       Current Problem  1. Chronic bilateral back pain, unspecified back location  Follow Up In Physical Therapy          General:  Reason for Referral: M54.9,G89.29 (ICD-10-CM) - Chronic bilateral back pain, unspecified back location  Referred By: Dr. Mcgarry  Past Medical History Relevant to Rehab: Juvenile Arthritis  General Comment: Visit #4 , No Auth Required          Subjective  If she does her HEP every other day.  Does some exercises every day at work. She feels they are helpful if she alma consistent.  She is feeling very tight in her hips.  Mostly left hip has pain in anterior and lateral hip, mostly with lifting motions, especially with walking up stairs or walking ov lifting up left leg.  Notes that she had labral tears in hips. Since she has been doing therapy, feels this is incidental.  Had pain in jection 3 in total, but has done really wel in between.  Will say that pain in 50% better    Precautions     Vital Signs     Pain: 3/10        Objective      Outcome Measures:  JOSÉ MIGUEL: 14  Treatments:    Therapeutic Activity: Reassessment of Objective Measures was performed: ROM, Strength via MMT/5xSTS, Edema, Soft tissue tenderness, Posture, Gait speed and quality via TUG/10 MWT, Balance via *** and Stair Negotiation; Completion of outcome survey; Discussion with patient regarding goal status/progress; Agreement on further PT POC and PT recommendations.    Lumbar AROM:  Flex: WNL, hams tight, no pain   Ext: WNL  Thoracic extension: min restrcition - some pain   SB R: WNL, mild pain in mid back   SB L: WNL no pain   Rot R: WNL no pain  Rot L: WNL, no pain     Right hip IR: 34  ER: 33  Left IR 21  ER 27  LE/Lumbopelvic Strength:  Hip Flex: R:5 L:4, P!  Hip ER: R:5 L:4-P!  Hip IR: R:5 L:4-P!  Knee Ext: R:5 L:4+  Ankle DF:  R: L:  Bilateral Bridge:  Unilateral Bridge: R: L:  Rectus Abdominis:   Transverse Abdominis/Sahrmann Level:  Hip Abduction: R:4  L:4   Hip Extension: R: 4+ L:4+ slight pain   Knee Flexion: R:  5             L:4+    Flexibility:  Hamstrings: R:    Fair    L:Fair  Quads: R:   Min tight        L: Fair  Quadratus Lumborum: R:      L:  Hip Flexors: R:          L:    Cervical AROM:  Flex:  Ext:  SB R:  SB L:  Rot R:  Rot L:    UE/Scapular strength:  Shoulder Flex:  Shoulder Ext:  Shoulder Abd:  Elbow Flex:  Elbow Ext:  Lower Trap: R: 4  L: 4-  Mid Trap:R: s4  L;  4-  Rhomboids:  R: 4      Therapeutic exercises  Seated  Neck retraction 5 sec hold x 10 reps  Shoulder shrugs and shoulder depression 3 sec hold x 10 reps  Shoulder scapular retraction and protraction 3 sec hold x 10 reps      Hook lying  Chest press with SA lift using PVC bar 1 x 15 reps  B shoulder flexion with PVC bar overhead 1 x 15 reps   B shoulder horizontal abduction with green theraband palms down and palms up 1 x 15 reps each  B shoulder ER with green  theraband 1 x 15 reps  UE PNF D2 with  green theraband 1 x 15 reps L/R  Bridging with PPT articulating spine up and down x 15 reps  LTR with opposite head rotation stretch 5 sec hold x 15 reps  Piriformis stretch pulling knee up and over to opposite shoulder 20 sec hold x 3 reps L/R  Figure 4 stretch 20 sec hold x 3 reps L/R  B clams with green  theraband 2 x 15 reps     Standing    B Shoulder mid-rows with green theraband 2 x 15  reps  B shoulder LAE with green theraband 2 x 15  reps  B Shoulder pull-downs with green theraband 1 x 10 reps - deferred this date         OP EDUCATION:  Access Code: [email or web address]  URL: [email or web address]  Date: 07/02/2025  Prepared by: Kaela Palacios    Exercises  - Hip Flexor Stretch at Edge of Bed  - 1 x daily - 7 x weekly - 1 sets - 3 reps - 30 seconds hold       Goals:  Active       PT Problem       Patient will improved Modified Oswestry score by  13 points indicating meaningful clinical change in functional mobility.        Start:  05/13/25    Expected End:  06/24/25            Patient will be independent and adherent to HEP to enhance functional progress and long term management of condition.  (Met)       Start:  05/13/25    Expected End:  06/24/25    Resolved:  07/02/25         Patient will demonstrate full and pain-free thoracic and lumbar ROM.       Start:  05/13/25    Expected End:  06/24/25            Patient will demonstrate good working knowledge of proper posture in relation to symptom management/reduction.  (Met)       Start:  05/13/25    Expected End:  06/24/25    Resolved:  07/02/25         Patient will improve scapular and trunk strength to 5/5/ in order to reduce postural strain and pain levels.        Start:  05/13/25    Expected End:  06/24/25            Patient will report reduction of back pain by 50% or greater.  (Met)       Start:  05/13/25    Expected End:  06/24/25    Resolved:  07/02/25         Patient will be able to sit at work for >1 hour without restriction of back pain. (Progressing)       Start:  05/13/25    Expected End:  06/24/25               improve scapular and trunk strength to 5/5/ in order to reduce postural strain and pain levels.  (Progressing)       Start:  05/13/25    Expected End:  09/01/25            Patient will report reduction of back pain by 50% or greater.  (Met)       Start:  05/13/25    Expected End:  06/24/25    Resolved:  07/02/25         Patient will be able to sit at work for >1 hour without restriction of back pain. (Progressing)       Start:  05/13/25    Expected End:  09/01/25               PT Problem       Patient will achieve bilateral hip strength of 5/5 to improve support of joints for reducing joint impact forces and pain.        Start:  07/07/25    Expected End:  09/01/25            Patient will report reduction of bilateral hip pain to no > 2/10 with all functional activities.        Start:  07/07/25    Expected End:  09/01/25

## 2025-07-07 DIAGNOSIS — M06.9: ICD-10-CM

## 2025-07-07 DIAGNOSIS — M25.50 ARTHRALGIA, UNSPECIFIED JOINT: ICD-10-CM

## 2025-07-07 RX ORDER — MELOXICAM 15 MG/1
15 TABLET ORAL DAILY
Qty: 30 TABLET | Refills: 3 | Status: SHIPPED | OUTPATIENT
Start: 2025-07-07

## 2025-07-07 RX ORDER — MELOXICAM 15 MG/1
15 TABLET ORAL DAILY
COMMUNITY
End: 2025-07-07 | Stop reason: SDUPTHER

## 2025-07-11 ENCOUNTER — APPOINTMENT (OUTPATIENT)
Dept: PHYSICAL THERAPY | Facility: CLINIC | Age: 40
End: 2025-07-11
Payer: COMMERCIAL

## 2025-07-11 DIAGNOSIS — G89.29 CHRONIC BILATERAL BACK PAIN, UNSPECIFIED BACK LOCATION: ICD-10-CM

## 2025-07-11 DIAGNOSIS — M54.9 CHRONIC BILATERAL BACK PAIN, UNSPECIFIED BACK LOCATION: ICD-10-CM

## 2025-07-25 ENCOUNTER — SPECIALTY PHARMACY (OUTPATIENT)
Dept: PHARMACY | Facility: CLINIC | Age: 40
End: 2025-07-25

## 2025-07-25 DIAGNOSIS — M06.9: ICD-10-CM

## 2025-07-25 RX ORDER — TOFACITINIB 11 MG/1
11 TABLET, FILM COATED, EXTENDED RELEASE ORAL DAILY
Qty: 90 TABLET | Refills: 1 | Status: SHIPPED | OUTPATIENT
Start: 2025-07-25

## 2025-07-29 ENCOUNTER — APPOINTMENT (OUTPATIENT)
Dept: PHYSICAL THERAPY | Facility: CLINIC | Age: 40
End: 2025-07-29
Payer: COMMERCIAL

## 2025-07-29 DIAGNOSIS — M54.9 CHRONIC BILATERAL BACK PAIN, UNSPECIFIED BACK LOCATION: ICD-10-CM

## 2025-07-29 DIAGNOSIS — G89.29 CHRONIC BILATERAL BACK PAIN, UNSPECIFIED BACK LOCATION: ICD-10-CM

## 2025-08-04 ENCOUNTER — APPOINTMENT (OUTPATIENT)
Dept: PRIMARY CARE | Facility: CLINIC | Age: 40
End: 2025-08-04
Payer: COMMERCIAL

## 2025-08-04 VITALS
DIASTOLIC BLOOD PRESSURE: 68 MMHG | RESPIRATION RATE: 14 BRPM | HEART RATE: 82 BPM | TEMPERATURE: 98.4 F | OXYGEN SATURATION: 99 % | WEIGHT: 200 LBS | HEIGHT: 67 IN | BODY MASS INDEX: 31.39 KG/M2 | SYSTOLIC BLOOD PRESSURE: 118 MMHG

## 2025-08-04 DIAGNOSIS — N95.1 SYMPTOMS, SUCH AS FLUSHING, SLEEPLESSNESS, HEADACHE, LACK OF CONCENTRATION, ASSOCIATED WITH THE MENOPAUSE: Primary | ICD-10-CM

## 2025-08-04 DIAGNOSIS — F41.9 ANXIETY AND DEPRESSION: ICD-10-CM

## 2025-08-04 DIAGNOSIS — M08.80 JUVENILE IDIOPATHIC ARTHRITIS (MULTI): ICD-10-CM

## 2025-08-04 DIAGNOSIS — F32.A ANXIETY AND DEPRESSION: ICD-10-CM

## 2025-08-04 DIAGNOSIS — E03.9 HYPOTHYROIDISM, UNSPECIFIED TYPE: ICD-10-CM

## 2025-08-04 PROBLEM — M06.9 MATERNAL RHEUMATOID ARTHRITIS COMPLICATING PREGNANCY (MULTI): Status: RESOLVED | Noted: 2023-04-27 | Resolved: 2025-08-04

## 2025-08-04 PROBLEM — M06.9: Status: RESOLVED | Noted: 2023-04-27 | Resolved: 2025-08-04

## 2025-08-04 PROBLEM — M06.9 RHEUMATOID ARTHRITIS: Status: RESOLVED | Noted: 2023-04-27 | Resolved: 2025-08-04

## 2025-08-04 PROBLEM — O99.891 MATERNAL RHEUMATOID ARTHRITIS COMPLICATING PREGNANCY (MULTI): Status: RESOLVED | Noted: 2023-04-27 | Resolved: 2025-08-04

## 2025-08-04 PROCEDURE — 3008F BODY MASS INDEX DOCD: CPT | Performed by: INTERNAL MEDICINE

## 2025-08-04 PROCEDURE — 99214 OFFICE O/P EST MOD 30 MIN: CPT | Performed by: INTERNAL MEDICINE

## 2025-08-04 RX ORDER — CITALOPRAM 20 MG/1
20 TABLET ORAL DAILY
Qty: 90 TABLET | Refills: 3 | Status: SHIPPED | OUTPATIENT
Start: 2025-08-04 | End: 2026-08-04

## 2025-08-04 ASSESSMENT — PATIENT HEALTH QUESTIONNAIRE - PHQ9
1. LITTLE INTEREST OR PLEASURE IN DOING THINGS: NOT AT ALL
2. FEELING DOWN, DEPRESSED OR HOPELESS: NOT AT ALL
SUM OF ALL RESPONSES TO PHQ9 QUESTIONS 1 AND 2: 0

## 2025-08-04 NOTE — PROGRESS NOTES
"Subjective    Sharon Lino is a 39 y.o. female who presents for Hot Flashes.  HPI    Reports hot flashes, fatigue, mood changes   Recent partial hysterectomy 9/2024  She has one ovary  Her hot flashes used to be at night only now they are all day.  Multiple times a day  Her mood is bad  She is not sleeping.    Review of Systems   All other systems reviewed and are negative.        Objective     /68 (BP Location: Left arm, Patient Position: Sitting, BP Cuff Size: Adult)   Pulse 82   Temp 36.9 °C (98.4 °F) (Skin)   Resp 14   Ht 1.702 m (5' 7\")   Wt 90.7 kg (200 lb)   LMP 12/07/2022   SpO2 99%   BMI 31.32 kg/m²    Physical Exam  Vitals reviewed.   Constitutional:       General: She is not in acute distress.     Appearance: Normal appearance.     Cardiovascular:      Rate and Rhythm: Normal rate and regular rhythm.      Pulses: Normal pulses.      Heart sounds: Normal heart sounds.   Pulmonary:      Effort: Pulmonary effort is normal.      Breath sounds: Normal breath sounds.   Abdominal:      Tenderness: There is no abdominal tenderness.     Musculoskeletal:         General: No swelling.     Skin:     General: Skin is warm and dry.     Neurological:      Mental Status: She is alert.       Health Maintenance Due   Topic Date Due    HIV Screening  Never done    Hepatitis B Vaccines (1 of 3 - 19+ 3-dose series) Never done    MMR Vaccines (1 of 1 - Standard series) Never done    HPV Vaccines (1 - 3-dose standard series) Never done    COVID-19 Vaccine (4 - 2024-25 season) 09/01/2024    Influenza Vaccine (1) 09/01/2025          Assessment/Plan   Problem List Items Addressed This Visit    None  Visit Diagnoses         Symptoms, such as flushing, sleeplessness, headache, lack of concentration, associated with the menopause    -  Primary    Relevant Orders    CBC    Comprehensive Metabolic Panel    FSH & LH      Anxiety and depression        Relevant Medications    citalopram (CeleXA) 20 mg tablet      " Hypothyroidism, unspecified type        Relevant Orders    TSH with reflex to Free T4 if abnormal        Perimenopausal sx.  Check labs.   Increase her citalopram. Consider adding low dose gabapentin.  Call  with any problems or questions.   Follow up as needed

## 2025-08-05 LAB
ALBUMIN SERPL-MCNC: 4.3 G/DL (ref 3.6–5.1)
ALP SERPL-CCNC: 65 U/L (ref 31–125)
ALT SERPL-CCNC: 11 U/L (ref 6–29)
ANION GAP SERPL CALCULATED.4IONS-SCNC: 6 MMOL/L (CALC) (ref 7–17)
AST SERPL-CCNC: 11 U/L (ref 10–30)
BILIRUB SERPL-MCNC: 0.3 MG/DL (ref 0.2–1.2)
BUN SERPL-MCNC: 11 MG/DL (ref 7–25)
CALCIUM SERPL-MCNC: 9.1 MG/DL (ref 8.6–10.2)
CHLORIDE SERPL-SCNC: 106 MMOL/L (ref 98–110)
CO2 SERPL-SCNC: 29 MMOL/L (ref 20–32)
CREAT SERPL-MCNC: 0.61 MG/DL (ref 0.5–0.97)
EGFRCR SERPLBLD CKD-EPI 2021: 117 ML/MIN/1.73M2
ERYTHROCYTE [DISTWIDTH] IN BLOOD BY AUTOMATED COUNT: 13.4 % (ref 11–15)
FSH SERPL-ACNC: 92.5 MIU/ML
GLUCOSE SERPL-MCNC: 88 MG/DL (ref 65–99)
HCT VFR BLD AUTO: 38.9 % (ref 35–45)
HGB BLD-MCNC: 12.5 G/DL (ref 11.7–15.5)
LH SERPL-ACNC: 50.7 MIU/ML
MCH RBC QN AUTO: 27.9 PG (ref 27–33)
MCHC RBC AUTO-ENTMCNC: 32.1 G/DL (ref 32–36)
MCV RBC AUTO: 86.8 FL (ref 80–100)
PLATELET # BLD AUTO: 343 THOUSAND/UL (ref 140–400)
PMV BLD REES-ECKER: 9.2 FL (ref 7.5–12.5)
POTASSIUM SERPL-SCNC: 4.5 MMOL/L (ref 3.5–5.3)
PROT SERPL-MCNC: 6.8 G/DL (ref 6.1–8.1)
RBC # BLD AUTO: 4.48 MILLION/UL (ref 3.8–5.1)
SODIUM SERPL-SCNC: 141 MMOL/L (ref 135–146)
TSH SERPL-ACNC: 0.65 MIU/L
WBC # BLD AUTO: 6.7 THOUSAND/UL (ref 3.8–10.8)

## 2025-08-06 DIAGNOSIS — N95.1 POST MENOPAUSAL SYNDROME: Primary | ICD-10-CM

## 2025-08-06 RX ORDER — GABAPENTIN 100 MG/1
100 CAPSULE ORAL NIGHTLY
Qty: 30 CAPSULE | Refills: 5 | Status: SHIPPED | OUTPATIENT
Start: 2025-08-06 | End: 2026-02-02

## 2025-08-13 ENCOUNTER — APPOINTMENT (OUTPATIENT)
Dept: RHEUMATOLOGY | Facility: CLINIC | Age: 40
End: 2025-08-13
Payer: COMMERCIAL

## 2025-08-19 ENCOUNTER — SPECIALTY PHARMACY (OUTPATIENT)
Dept: PHARMACY | Facility: CLINIC | Age: 40
End: 2025-08-19

## 2025-08-20 ENCOUNTER — APPOINTMENT (OUTPATIENT)
Dept: OBSTETRICS AND GYNECOLOGY | Facility: CLINIC | Age: 40
End: 2025-08-20
Payer: COMMERCIAL

## 2025-08-20 VITALS — DIASTOLIC BLOOD PRESSURE: 86 MMHG | WEIGHT: 197 LBS | BODY MASS INDEX: 30.85 KG/M2 | SYSTOLIC BLOOD PRESSURE: 126 MMHG

## 2025-08-20 DIAGNOSIS — N94.19 DYSPAREUNIA DUE TO MEDICAL CONDITION IN FEMALE: ICD-10-CM

## 2025-08-20 DIAGNOSIS — R41.89 BRAIN FOG: ICD-10-CM

## 2025-08-20 DIAGNOSIS — R45.4 IRRITABILITY: ICD-10-CM

## 2025-08-20 DIAGNOSIS — R53.83 OTHER FATIGUE: ICD-10-CM

## 2025-08-20 DIAGNOSIS — E28.39 PREMATURE OVARIAN INSUFFICIENCY: Primary | ICD-10-CM

## 2025-08-20 DIAGNOSIS — R45.86 MOOD SWINGS: ICD-10-CM

## 2025-08-20 DIAGNOSIS — G47.09 TROUBLE GETTING TO SLEEP: ICD-10-CM

## 2025-08-20 DIAGNOSIS — Z15.09 BRCA2 GENETIC CARRIER: ICD-10-CM

## 2025-08-20 DIAGNOSIS — N95.8 GENITOURINARY SYNDROME OF MENOPAUSE: ICD-10-CM

## 2025-08-20 DIAGNOSIS — N95.1 HOT FLASH, MENOPAUSAL: ICD-10-CM

## 2025-08-20 DIAGNOSIS — R63.5 WEIGHT GAIN: ICD-10-CM

## 2025-08-20 DIAGNOSIS — F52.0 HYPOACTIVE SEXUAL DESIRE DISORDER: ICD-10-CM

## 2025-08-20 DIAGNOSIS — R53.83 LACK OF ENERGY: ICD-10-CM

## 2025-08-20 DIAGNOSIS — Z15.01 BRCA2 GENETIC CARRIER: ICD-10-CM

## 2025-08-20 PROCEDURE — 99417 PROLNG OP E/M EACH 15 MIN: CPT | Performed by: OBSTETRICS & GYNECOLOGY

## 2025-08-20 PROCEDURE — 99215 OFFICE O/P EST HI 40 MIN: CPT | Performed by: OBSTETRICS & GYNECOLOGY

## 2025-08-20 RX ORDER — ESTRADIOL 0.1 MG/G
1 CREAM VAGINAL NIGHTLY
Qty: 42.5 G | Refills: 3 | Status: SHIPPED | OUTPATIENT
Start: 2025-08-20 | End: 2026-08-20

## 2025-08-20 RX ORDER — ESTRADIOL 0.1 MG/D
1 FILM, EXTENDED RELEASE TRANSDERMAL 2 TIMES WEEKLY
Qty: 24 PATCH | Refills: 3 | Status: SHIPPED | OUTPATIENT
Start: 2025-08-21 | End: 2026-08-21

## 2025-08-20 ASSESSMENT — ENCOUNTER SYMPTOMS
PSYCHIATRIC NEGATIVE: 0
GASTROINTESTINAL NEGATIVE: 0
CONSTITUTIONAL NEGATIVE: 0
RESPIRATORY NEGATIVE: 0
ENDOCRINE NEGATIVE: 0
MUSCULOSKELETAL NEGATIVE: 0
EYES NEGATIVE: 0
CARDIOVASCULAR NEGATIVE: 0
ALLERGIC/IMMUNOLOGIC NEGATIVE: 0
HEMATOLOGIC/LYMPHATIC NEGATIVE: 0
NEUROLOGICAL NEGATIVE: 0

## 2025-08-20 ASSESSMENT — PAIN SCALES - GENERAL: PAINLEVEL_OUTOF10: 0-NO PAIN

## 2025-08-21 ENCOUNTER — SPECIALTY PHARMACY (OUTPATIENT)
Dept: PHARMACY | Facility: CLINIC | Age: 40
End: 2025-08-21

## 2025-08-21 PROCEDURE — RXMED WILLOW AMBULATORY MEDICATION CHARGE

## 2025-08-22 LAB
ESTRADIOL SERPL-MCNC: 77 PG/ML
FSH SERPL-ACNC: 76.9 MIU/ML

## 2025-08-27 ENCOUNTER — SPECIALTY PHARMACY (OUTPATIENT)
Dept: PHARMACY | Facility: CLINIC | Age: 40
End: 2025-08-27

## 2025-09-02 ENCOUNTER — PHARMACY VISIT (OUTPATIENT)
Dept: PHARMACY | Facility: CLINIC | Age: 40
End: 2025-09-02
Payer: COMMERCIAL

## 2025-09-15 ENCOUNTER — APPOINTMENT (OUTPATIENT)
Facility: CLINIC | Age: 40
End: 2025-09-15
Payer: COMMERCIAL

## 2025-11-19 ENCOUNTER — APPOINTMENT (OUTPATIENT)
Dept: OBSTETRICS AND GYNECOLOGY | Facility: CLINIC | Age: 40
End: 2025-11-19
Payer: COMMERCIAL

## (undated) DEVICE — TUBING SET, TRI-LUMEN, FILTERED, F/AIRSEAL

## (undated) DEVICE — STRIP, SKIN CLOSURE, STERI STRIP, REINFORCED, 0.5 X 4 IN

## (undated) DEVICE — DRIVER, MEGA NEEDLE

## (undated) DEVICE — SUTURE, MONOCRYL PLUS, 4-0, PS-2 UD 27IN

## (undated) DEVICE — COVER, TIP HOT SHEARS ENDOWRIST

## (undated) DEVICE — DRESSING, NON-ADHERENT, TELFA, 3 X 8 IN, NS

## (undated) DEVICE — DRAPE, PAD, PREP, W/ 9 IN CUFF, 24 X 41, LF, NS

## (undated) DEVICE — PREP TRAY, SKIN, DRY, W/GLOVES

## (undated) DEVICE — Device

## (undated) DEVICE — DRAPE, ARM XI

## (undated) DEVICE — ACCESS PORT, 8M M, LOW PROFILE W/BLADELESS OPTICAL TIP, 100MM LENGTH

## (undated) DEVICE — SPONGE GAUZE, XRAY SC+RFID, 4X4 16 PLY, STERILE

## (undated) DEVICE — APPLICATOR, ENDOSCOPIC, F/SURGICEL POWDER

## (undated) DEVICE — SPONGE, LAP, XRAY DECT, 18IN X 18IN, W/LOOP, STERILE

## (undated) DEVICE — PROTECTOR, NERVE, ULNAR, PINK

## (undated) DEVICE — SYRINGE, 60 CC, LUER LOCK, MONOJECT

## (undated) DEVICE — MANIFOLD, 4 PORT NEPTUNE STANDARD

## (undated) DEVICE — DRAPE PACK, UNIVERSAL II

## (undated) DEVICE — SUTURE, VICRYL, 0, 27 IN, UR-6, VIOLET

## (undated) DEVICE — RETRACTOR, CERVICAL CUP, VCARE, STANDARD

## (undated) DEVICE — SYRINGE, 60 CC, IRRIGATION, BULB, CONTRO-BULB, PAPER POUCH

## (undated) DEVICE — KIT, ROBOTIC, CUSTOM UHC

## (undated) DEVICE — OBTURATOR, BLADELESS , SU

## (undated) DEVICE — DRAPE, COLUMN, DAVINCI XI

## (undated) DEVICE — CATHETER TRAY, SURESTEP, 16FR, URINE METER W/STATLOCK

## (undated) DEVICE — POSITIONING, THE PINK PAD, PIGAZZI SYSTEM

## (undated) DEVICE — SEAL, UNIVERSAL 5-8MM  XI

## (undated) DEVICE — HEMOSTAT, SURGICEL POWDER 3.0GRAMS

## (undated) DEVICE — TOWELS 4-PK

## (undated) DEVICE — IRRIGATION SET, CYSTOSCOPY, F/CONSTANT/INTERMITTENT, 8 GTT/CC, 77 IN

## (undated) DEVICE — OCCLUDER, COLPO-PNEUMO

## (undated) DEVICE — GOWN, SURGICAL, SMARTGOWN, XLARGE, STERILE